# Patient Record
Sex: MALE | Race: WHITE | Employment: FULL TIME | URBAN - METROPOLITAN AREA
[De-identification: names, ages, dates, MRNs, and addresses within clinical notes are randomized per-mention and may not be internally consistent; named-entity substitution may affect disease eponyms.]

---

## 2020-11-02 ENCOUNTER — APPOINTMENT (OUTPATIENT)
Dept: GENERAL RADIOLOGY | Age: 60
DRG: 189 | End: 2020-11-02
Attending: EMERGENCY MEDICINE
Payer: COMMERCIAL

## 2020-11-02 ENCOUNTER — APPOINTMENT (OUTPATIENT)
Dept: CT IMAGING | Age: 60
DRG: 189 | End: 2020-11-02
Attending: EMERGENCY MEDICINE
Payer: COMMERCIAL

## 2020-11-02 ENCOUNTER — HOSPITAL ENCOUNTER (INPATIENT)
Age: 60
LOS: 3 days | Discharge: HOME | DRG: 189 | End: 2020-11-05
Attending: EMERGENCY MEDICINE | Admitting: INTERNAL MEDICINE
Payer: COMMERCIAL

## 2020-11-02 DIAGNOSIS — D75.1 POLYCYTHEMIA: ICD-10-CM

## 2020-11-02 DIAGNOSIS — E87.5 HYPERKALEMIA: ICD-10-CM

## 2020-11-02 DIAGNOSIS — E66.2 HYPOVENTILATION ASSOCIATED WITH OBESITY SYNDROME (HCC): ICD-10-CM

## 2020-11-02 DIAGNOSIS — J96.02 ACUTE RESPIRATORY FAILURE WITH HYPOXIA AND HYPERCAPNIA (HCC): ICD-10-CM

## 2020-11-02 DIAGNOSIS — F17.200 TOBACCO DEPENDENCE: ICD-10-CM

## 2020-11-02 DIAGNOSIS — J96.22 ACUTE ON CHRONIC RESPIRATORY FAILURE WITH HYPERCAPNIA (HCC): Primary | ICD-10-CM

## 2020-11-02 DIAGNOSIS — J96.01 ACUTE RESPIRATORY FAILURE WITH HYPOXIA AND HYPERCAPNIA (HCC): ICD-10-CM

## 2020-11-02 DIAGNOSIS — E66.01 OBESITIES, MORBID (HCC): ICD-10-CM

## 2020-11-02 DIAGNOSIS — R59.0 MEDIASTINAL ADENOPATHY: ICD-10-CM

## 2020-11-02 PROBLEM — J96.11 CHRONIC RESPIRATORY FAILURE WITH HYPOXIA (HCC): Status: ACTIVE | Noted: 2020-11-02

## 2020-11-02 LAB
ALBUMIN SERPL-MCNC: 3.5 G/DL (ref 3.2–4.6)
ALBUMIN/GLOB SERPL: 0.8 {RATIO} (ref 1.2–3.5)
ALP SERPL-CCNC: 67 U/L (ref 50–136)
ALT SERPL-CCNC: 34 U/L (ref 12–65)
ANION GAP SERPL CALC-SCNC: 2 MMOL/L (ref 7–16)
ARTERIAL PATENCY WRIST A: YES
AST SERPL-CCNC: 43 U/L (ref 15–37)
ATRIAL RATE: 70 BPM
BASE EXCESS BLD CALC-SCNC: 5 MMOL/L
BASE EXCESS BLD CALC-SCNC: 7 MMOL/L
BASE EXCESS BLD CALC-SCNC: 7 MMOL/L
BASOPHILS # BLD: 0.1 K/UL (ref 0–0.2)
BASOPHILS NFR BLD: 1 % (ref 0–2)
BDY SITE: ABNORMAL
BILIRUB SERPL-MCNC: 1.1 MG/DL (ref 0.2–1.1)
BUN SERPL-MCNC: 11 MG/DL (ref 8–23)
CALCIUM SERPL-MCNC: 9.1 MG/DL (ref 8.3–10.4)
CALCULATED P AXIS, ECG09: 26 DEGREES
CALCULATED R AXIS, ECG10: 109 DEGREES
CALCULATED T AXIS, ECG11: 41 DEGREES
CHLORIDE SERPL-SCNC: 94 MMOL/L (ref 98–107)
CO2 BLD-SCNC: 39 MMOL/L
CO2 BLD-SCNC: 40 MMOL/L
CO2 BLD-SCNC: 43 MMOL/L
CO2 SERPL-SCNC: 39 MMOL/L (ref 21–32)
COLLECT TIME,HTIME: 1140
COLLECT TIME,HTIME: 1431
COLLECT TIME,HTIME: 1803
CREAT SERPL-MCNC: 1.09 MG/DL (ref 0.8–1.5)
DIAGNOSIS, 93000: NORMAL
DIFFERENTIAL METHOD BLD: ABNORMAL
EOSINOPHIL # BLD: 0.1 K/UL (ref 0–0.8)
EOSINOPHIL NFR BLD: 2 % (ref 0.5–7.8)
ERYTHROCYTE [DISTWIDTH] IN BLOOD BY AUTOMATED COUNT: 13.3 % (ref 11.9–14.6)
EST. AVERAGE GLUCOSE BLD GHB EST-MCNC: 260 MG/DL
EXHALED MINUTE VOLUME, VE: 13 L/MIN
FLOW RATE ISTAT,IFRATE: 1 L/MIN
FLOW RATE ISTAT,IFRATE: 15 L/MIN
GAS FLOW.O2 O2 DELIVERY SYS: ABNORMAL L/MIN
GAS FLOW.O2 SETTING OXYMISER: 24 BPM
GLOBULIN SER CALC-MCNC: 4.4 G/DL (ref 2.3–3.5)
GLUCOSE BLD STRIP.AUTO-MCNC: 124 MG/DL (ref 65–100)
GLUCOSE SERPL-MCNC: 257 MG/DL (ref 65–100)
HBA1C MFR BLD: 10.7 % (ref 4.8–6)
HCO3 BLD-SCNC: 37.1 MMOL/L (ref 22–26)
HCO3 BLD-SCNC: 37.7 MMOL/L (ref 22–26)
HCO3 BLD-SCNC: 40.2 MMOL/L (ref 22–26)
HCT VFR BLD AUTO: 56.8 % (ref 41.1–50.3)
HGB BLD-MCNC: 17.5 G/DL (ref 13.6–17.2)
IMM GRANULOCYTES # BLD AUTO: 0 K/UL (ref 0–0.5)
IMM GRANULOCYTES NFR BLD AUTO: 0 % (ref 0–5)
LACTATE SERPL-SCNC: 0.9 MMOL/L (ref 0.4–2)
LIPASE SERPL-CCNC: 56 U/L (ref 73–393)
LYMPHOCYTES # BLD: 1.3 K/UL (ref 0.5–4.6)
LYMPHOCYTES NFR BLD: 15 % (ref 13–44)
MCH RBC QN AUTO: 29.7 PG (ref 26.1–32.9)
MCHC RBC AUTO-ENTMCNC: 30.8 G/DL (ref 31.4–35)
MCV RBC AUTO: 96.4 FL (ref 79.6–97.8)
MONOCYTES # BLD: 0.5 K/UL (ref 0.1–1.3)
MONOCYTES NFR BLD: 5 % (ref 4–12)
NEUTS SEG # BLD: 6.4 K/UL (ref 1.7–8.2)
NEUTS SEG NFR BLD: 77 % (ref 43–78)
NRBC # BLD: 0 K/UL (ref 0–0.2)
O2/TOTAL GAS SETTING VFR VENT: 100 %
O2/TOTAL GAS SETTING VFR VENT: 30 %
P-R INTERVAL, ECG05: 170 MS
PCO2 BLD: 75.2 MMHG (ref 35–45)
PCO2 BLD: 92.5 MMHG (ref 35–45)
PCO2 BLD: 94.8 MMHG (ref 35–45)
PEEP RESPIRATORY: 10 CMH2O
PH BLD: 7.22 [PH] (ref 7.35–7.45)
PH BLD: 7.24 [PH] (ref 7.35–7.45)
PH BLD: 7.3 [PH] (ref 7.35–7.45)
PIP ISTAT,IPIP: 23
PLATELET # BLD AUTO: 152 K/UL (ref 150–450)
PMV BLD AUTO: 12.6 FL (ref 9.4–12.3)
PO2 BLD: 306 MMHG (ref 75–100)
PO2 BLD: 73 MMHG (ref 75–100)
PO2 BLD: 92 MMHG (ref 75–100)
POTASSIUM SERPL-SCNC: 5.4 MMOL/L (ref 3.5–5.1)
PROT SERPL-MCNC: 7.9 G/DL (ref 6.3–8.2)
Q-T INTERVAL, ECG07: 378 MS
QRS DURATION, ECG06: 84 MS
QTC CALCULATION (BEZET), ECG08: 408 MS
RBC # BLD AUTO: 5.89 M/UL (ref 4.23–5.6)
SAO2 % BLD: 100 % (ref 95–98)
SAO2 % BLD: 92 % (ref 95–98)
SAO2 % BLD: 94 % (ref 95–98)
SERVICE CMNT-IMP: ABNORMAL
SODIUM SERPL-SCNC: 135 MMOL/L (ref 138–145)
SPECIMEN TYPE: ABNORMAL
SPONTANEOUS TIMED, IST: 1
TSH SERPL DL<=0.005 MIU/L-ACNC: 2.37 UIU/ML (ref 0.36–3.74)
VENTRICULAR RATE, ECG03: 70 BPM
WBC # BLD AUTO: 8.3 K/UL (ref 4.3–11.1)

## 2020-11-02 PROCEDURE — 94640 AIRWAY INHALATION TREATMENT: CPT

## 2020-11-02 PROCEDURE — 71260 CT THORAX DX C+: CPT

## 2020-11-02 PROCEDURE — 74011000250 HC RX REV CODE- 250: Performed by: INTERNAL MEDICINE

## 2020-11-02 PROCEDURE — 99285 EMERGENCY DEPT VISIT HI MDM: CPT

## 2020-11-02 PROCEDURE — 82962 GLUCOSE BLOOD TEST: CPT

## 2020-11-02 PROCEDURE — 74011000258 HC RX REV CODE- 258: Performed by: EMERGENCY MEDICINE

## 2020-11-02 PROCEDURE — 74011250636 HC RX REV CODE- 250/636: Performed by: INTERNAL MEDICINE

## 2020-11-02 PROCEDURE — 94660 CPAP INITIATION&MGMT: CPT

## 2020-11-02 PROCEDURE — 36600 WITHDRAWAL OF ARTERIAL BLOOD: CPT

## 2020-11-02 PROCEDURE — 74011250636 HC RX REV CODE- 250/636: Performed by: EMERGENCY MEDICINE

## 2020-11-02 PROCEDURE — 2709999900 HC NON-CHARGEABLE SUPPLY

## 2020-11-02 PROCEDURE — 80053 COMPREHEN METABOLIC PANEL: CPT

## 2020-11-02 PROCEDURE — 99223 1ST HOSP IP/OBS HIGH 75: CPT | Performed by: INTERNAL MEDICINE

## 2020-11-02 PROCEDURE — 83690 ASSAY OF LIPASE: CPT

## 2020-11-02 PROCEDURE — 65610000001 HC ROOM ICU GENERAL

## 2020-11-02 PROCEDURE — 74011000636 HC RX REV CODE- 636: Performed by: EMERGENCY MEDICINE

## 2020-11-02 PROCEDURE — 93005 ELECTROCARDIOGRAM TRACING: CPT | Performed by: EMERGENCY MEDICINE

## 2020-11-02 PROCEDURE — 5A09457 ASSISTANCE WITH RESPIRATORY VENTILATION, 24-96 CONSECUTIVE HOURS, CONTINUOUS POSITIVE AIRWAY PRESSURE: ICD-10-PCS | Performed by: INTERNAL MEDICINE

## 2020-11-02 PROCEDURE — 83036 HEMOGLOBIN GLYCOSYLATED A1C: CPT

## 2020-11-02 PROCEDURE — 84443 ASSAY THYROID STIM HORMONE: CPT

## 2020-11-02 PROCEDURE — 85025 COMPLETE CBC W/AUTO DIFF WBC: CPT

## 2020-11-02 PROCEDURE — 83605 ASSAY OF LACTIC ACID: CPT

## 2020-11-02 PROCEDURE — 87040 BLOOD CULTURE FOR BACTERIA: CPT

## 2020-11-02 PROCEDURE — 71045 X-RAY EXAM CHEST 1 VIEW: CPT

## 2020-11-02 PROCEDURE — 82803 BLOOD GASES ANY COMBINATION: CPT

## 2020-11-02 RX ORDER — IPRATROPIUM BROMIDE AND ALBUTEROL SULFATE 2.5; .5 MG/3ML; MG/3ML
3 SOLUTION RESPIRATORY (INHALATION)
Status: DISCONTINUED | OUTPATIENT
Start: 2020-11-02 | End: 2020-11-03

## 2020-11-02 RX ORDER — SODIUM CHLORIDE 0.9 % (FLUSH) 0.9 %
5-40 SYRINGE (ML) INJECTION EVERY 8 HOURS
Status: DISCONTINUED | OUTPATIENT
Start: 2020-11-02 | End: 2020-11-05 | Stop reason: HOSPADM

## 2020-11-02 RX ORDER — ENOXAPARIN SODIUM 100 MG/ML
40 INJECTION SUBCUTANEOUS EVERY 12 HOURS
Status: DISCONTINUED | OUTPATIENT
Start: 2020-11-02 | End: 2020-11-05 | Stop reason: HOSPADM

## 2020-11-02 RX ORDER — BUDESONIDE 0.5 MG/2ML
500 INHALANT ORAL
Status: DISCONTINUED | OUTPATIENT
Start: 2020-11-02 | End: 2020-11-05 | Stop reason: HOSPADM

## 2020-11-02 RX ORDER — SODIUM CHLORIDE 0.9 % (FLUSH) 0.9 %
10 SYRINGE (ML) INJECTION
Status: COMPLETED | OUTPATIENT
Start: 2020-11-02 | End: 2020-11-02

## 2020-11-02 RX ORDER — SODIUM CHLORIDE 0.9 % (FLUSH) 0.9 %
5-40 SYRINGE (ML) INJECTION AS NEEDED
Status: DISCONTINUED | OUTPATIENT
Start: 2020-11-02 | End: 2020-11-05 | Stop reason: HOSPADM

## 2020-11-02 RX ADMIN — IOPAMIDOL 100 ML: 755 INJECTION, SOLUTION INTRAVENOUS at 12:27

## 2020-11-02 RX ADMIN — BUDESONIDE 500 MCG: 0.5 INHALANT RESPIRATORY (INHALATION) at 19:22

## 2020-11-02 RX ADMIN — SODIUM CHLORIDE 100 ML: 900 INJECTION, SOLUTION INTRAVENOUS at 12:27

## 2020-11-02 RX ADMIN — ENOXAPARIN SODIUM 40 MG: 40 INJECTION SUBCUTANEOUS at 21:18

## 2020-11-02 RX ADMIN — Medication 10 ML: at 16:44

## 2020-11-02 RX ADMIN — Medication 10 ML: at 22:00

## 2020-11-02 RX ADMIN — Medication 10 ML: at 12:27

## 2020-11-02 RX ADMIN — IPRATROPIUM BROMIDE AND ALBUTEROL SULFATE 3 ML: .5; 3 SOLUTION RESPIRATORY (INHALATION) at 15:20

## 2020-11-02 RX ADMIN — IPRATROPIUM BROMIDE AND ALBUTEROL SULFATE 3 ML: .5; 3 SOLUTION RESPIRATORY (INHALATION) at 19:22

## 2020-11-02 RX ADMIN — SODIUM CHLORIDE 1000 ML: 900 INJECTION, SOLUTION INTRAVENOUS at 11:43

## 2020-11-02 NOTE — ED PROVIDER NOTES
61-year-old male presenting for worsening shortness of breath and dyspnea upon exertion. He tells me that his symptoms go already back to 2018. There was consideration he may have sleep apnea and was supposed to follow-up for sleep study but did not get along with the physician so he never followed up. Over the past month though he is noted that he becomes short of breath doing pretty much anything. Denies fevers, chills, chest pain. Has had no increased weight gain. He denies any new medications. The history is provided by the patient and the spouse. Shortness of Breath   This is a chronic problem. The problem occurs continuously. The current episode started more than 1 week ago. The problem has been gradually worsening. Pertinent negatives include no fever, no headaches, no coryza, no rhinorrhea, no sore throat, no swollen glands, no ear pain, no neck pain, no cough, no sputum production, no hemoptysis, no wheezing, no PND, no orthopnea, no chest pain, no syncope, no vomiting, no abdominal pain, no rash, no leg pain, no leg swelling and no claudication. It is unknown what precipitated the problem. He has tried nothing for the symptoms. The treatment provided no relief. He has had prior hospitalizations. He has had prior ED visits. He has had no prior ICU admissions. Associated medical issues do not include asthma, COPD, pneumonia, chronic lung disease, PE, CAD, heart failure, past MI, DVT or recent surgery. No past medical history on file. No past surgical history on file.       Family History:   Problem Relation Age of Onset    Hypertension Mother        Social History     Socioeconomic History    Marital status:      Spouse name: Not on file    Number of children: Not on file    Years of education: Not on file    Highest education level: Not on file   Occupational History    Not on file   Social Needs    Financial resource strain: Not on file    Food insecurity     Worry: Not on file     Inability: Not on file    Transportation needs     Medical: Not on file     Non-medical: Not on file   Tobacco Use    Smoking status: Never Smoker    Smokeless tobacco: Never Used   Substance and Sexual Activity    Alcohol use: Not Currently    Drug use: Not on file    Sexual activity: Not on file   Lifestyle    Physical activity     Days per week: Not on file     Minutes per session: Not on file    Stress: Not on file   Relationships    Social connections     Talks on phone: Not on file     Gets together: Not on file     Attends Tenriism service: Not on file     Active member of club or organization: Not on file     Attends meetings of clubs or organizations: Not on file     Relationship status: Not on file    Intimate partner violence     Fear of current or ex partner: Not on file     Emotionally abused: Not on file     Physically abused: Not on file     Forced sexual activity: Not on file   Other Topics Concern    Not on file   Social History Narrative    Not on file         ALLERGIES: Patient has no known allergies. Review of Systems   Constitutional: Negative for fever. HENT: Negative for ear pain, rhinorrhea and sore throat. Respiratory: Positive for shortness of breath. Negative for cough, hemoptysis, sputum production and wheezing. Cardiovascular: Negative for chest pain, orthopnea, claudication, leg swelling, syncope and PND. Gastrointestinal: Negative for abdominal pain and vomiting. Musculoskeletal: Negative for neck pain. Skin: Negative for rash. Neurological: Negative for headaches. All other systems reviewed and are negative. Vitals:    11/02/20 1016 11/02/20 1022   BP: (!) 163/89    Pulse: 73    Resp: 18    Temp: 98.1 °F (36.7 °C)    SpO2: (!) 76% 94%   Weight: 129.3 kg (285 lb)    Height: 5' 7\" (1.702 m)             Physical Exam  Vitals signs and nursing note reviewed. Constitutional:       Appearance: He is well-developed. He is obese.    HENT: Head: Normocephalic and atraumatic. Eyes:      Conjunctiva/sclera: Conjunctivae normal.      Pupils: Pupils are equal, round, and reactive to light. Neck:      Musculoskeletal: Normal range of motion and neck supple. Cardiovascular:      Rate and Rhythm: Normal rate and regular rhythm. Heart sounds: Normal heart sounds. Pulmonary:      Effort: Pulmonary effort is normal.      Breath sounds: Normal breath sounds. Abdominal:      General: Bowel sounds are normal.      Palpations: Abdomen is soft. Musculoskeletal: Normal range of motion. General: No deformity. Skin:     General: Skin is warm and dry. Neurological:      Mental Status: He is alert and oriented to person, place, and time. Cranial Nerves: No cranial nerve deficit. Psychiatric:         Behavior: Behavior normal.          MDM  Number of Diagnoses or Management Options  Acute on chronic respiratory failure with hypercapnia [facility]):   Diagnosis management comments: 69-year-old male presenting for worsening shortness of breath upon exertion. Tells me that he was supposed to get worked up for sleep apnea and never followed up. My concern in this circumstance is the patient has undiagnosed pulmonary fibrosis or pulmonary hypertension. We will do heart failure, pneumonia and likely PE work-up. Patient's initial O2 sats were 75% on room air and he does not have oxygen at home.        Amount and/or Complexity of Data Reviewed  Clinical lab tests: ordered and reviewed  Tests in the radiology section of CPT®: reviewed and ordered  Discuss the patient with other providers: yes (Discussed the case with the intensivist will review information)  Independent visualization of images, tracings, or specimens: yes (Reviewed the patient's imaging)    Risk of Complications, Morbidity, and/or Mortality  Presenting problems: high  Diagnostic procedures: high  Management options: high  General comments: I personally reviewed the patient's vital signs, laboratory tests, and/or radiological findings. I discussed these findings with the patient and their significance. I answered all questions and explained that given these findings there is significant concern for increased morbidity and/or mortality without immediate intervention. As a result, I recommended admission to the hospital, consulted the appropriate service, and transitioned care to that service in improved condition        ED Course as of Nov 02 1351   Mon Nov 02, 2020   1112 Reviewed the patient's chest x-ray which appears to show cardiomegaly and some pulmonary congestion    [JS]   1114 Elevated hematocrit consistent with chronic hypoxia    [JS]   1150 Patient has hypercarbic respiratory failure. Were 20 lower his nasal cannula so that we are targeting 90% oxygen saturations. Suspicion he is a chronic retainer of CO2 may have his supplemental oxygen too high which is slowing his respiratory drive    [JS]   0087 Patient does appear to have a hypercarbic respiratory failure. This is probably acute on chronic we may have made it worse with supplemental oxygen. The patient has become functionally intolerant due to hypoxia. Attempted to contact the intensivist who replied that they were unable to come right now because there was a code somewhere else in the hospital.    [JS]   [phone] callback who will evaluate the patient. Requested a repeat blood gas to see if his pH is improved at all. If not we will probably start on BiPAP and put the patient in the unit.     [JS]      ED Course User Index  [JS] Chema Reaves MD       Critical Care  Performed by: Chema Reaves MD  Authorized by: Chema Reaves MD     Critical care provider statement:     Critical care time (minutes):  39    Critical care was necessary to treat or prevent imminent or life-threatening deterioration of the following conditions:  Respiratory failure    Critical care was time spent personally by me on the following activities:  Blood draw for specimens, ordering and performing treatments and interventions, development of treatment plan with patient or surrogate, ordering and review of laboratory studies, discussions with consultants, ordering and review of radiographic studies, discussions with primary provider, pulse oximetry, evaluation of patient's response to treatment, re-evaluation of patient's condition, examination of patient, review of old charts and obtaining history from patient or surrogate    I assumed direction of critical care for this patient from another provider in my specialty: no    Comments:      Acute on chronic respiratory failure

## 2020-11-02 NOTE — H&P
PULMONARY/CCM HISTORY AND PHYSICAL:  11/2/2020    Date of Admission:  11/2/2020    The patient's chart has been reviewed and the chart has been discussed with nursing staff. Subjective: This patient has been seen and evaluated at the request of Dr. Seda Carvalho. Patient is a  male who was seen at [facility] today by Dr Dorothea Hoang, where he was noted to by hypoxic with an O2 sat in the 80s. He was instructed to come to the ER for further workup. The patient was a new patient for the practice and reported that he had not seen a PCP for many years. Last visit to to a physician office was in 2018 where he went to [facility] and was directly admitted to [facility] for further workup regarding bronchitis. Per the wife and patient, during that admission he was told that he needed O2 at home and had sleep apnea, but never was seen outpt as he did not care for the physician he seen. No further workup was ever completed. He states that he has noticed that his symptoms have persisted since that timeframe with minimal improvement noted over the years. Over the past month, he has noticed that his SOB has increased in severity. He reports that he has been using \"home oxygen\". When further questioned about this, he states that he bought the high altitude tanks that hikers use since he could not have Oxygen without a prescription. He denies any fever, productive sputum,  chills, chest pain, nausea, vomiting, diarrhea, known sick contacts, or any known kidney or lung problems. States that he was told his blood glucose was elevated upon his visit to the PCP today. Also reports, occasional wheezing with exertion, dyspnea, periodic ankle swelling at times. He denies any known asthma, COPD, pneumonia, chronic lung disease, CAD, heart failure, past MIs, DVT, recent surgeries, or kidney problems. States he may be a diabetic per the PCP this afternoon, but he is unsure.   He does not take any current medications. He reports that he is a former \"social\" smoker with the occassional cigar. He reports second hand smoke growing up. 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 States he has had about a 10 lb weight gain over the past 6 months-1 year. His wife reports that they have slept in different rooms for approx 1 year, as he \"snores and jerks\". She reports noticing apneic periods as well. A CT chest was completed today, which showed slight atelectatic areas, no pleural effusions, or concerns for PNA. It did show a slightly enlarged AP window lymph node. Initial ABG on NRB showed pH 7.24, pCO2 94.8, PO2 306, HCO3 40.2. Repeat ABG on 4 LPM NC in the ER showed PH 7.21, pCO2 95.5, PO2 92, HCO3 37.7. He was placed on BiPAP to assist with CO2 levels. He is completely alert and oriented x 4. We were consulted to assist with further pulmonary workup, including BiPap management. I plan to leave the patient on BiPap for a few hours and repeat the ABG prior to admitting to establish if he needs ICU/floor with BiPAP q HS. Given the severity of the patient's severe chronic respiratory failure secondary to hypoventilation syndrome, he needs at minimum BiPap, possible triology, nightly and during naps to help with life sustaining measures. He will require further workup in the office to establish what settings and such will be required, but for now we will place him on BiPap with RT to adjust according to ventilation requirements. Will need to be reevaluated after BiPAP therapy to establish if BiPAP is sufficient for him. If CO2 levels continue to be elevated even with BIPAP therapy, he will need to be evaluated for trilogy. Without the use of this serious harm and/or death will occur due to elevated CO2 levels. No past surgical history on file.    Social History     Tobacco Use    Smoking status: Never Smoker    Smokeless tobacco: Never Used   Substance Use Topics    Alcohol use: Not Currently      Family History   Problem Relation Age of Onset    Hypertension Mother       No Known Allergies   None       MEDS SCHEDULED:    Current Facility-Administered Medications   Medication Dose Route Frequency    albuterol-ipratropium (DUO-NEB) 2.5 MG-0.5 MG/3 ML  3 mL Nebulization QID RT    budesonide (PULMICORT) 500 mcg/2 ml nebulizer suspension  500 mcg Nebulization BID RT     No current outpatient medications on file. Review of Systems  Pertinent items are noted in HPI. Objective:     Vitals:    11/02/20 1128 11/02/20 1149 11/02/20 1344 11/02/20 1447   BP: (!) 153/90  (!) 160/77    Pulse: 65 66 63 69   Resp:  20 23 25   Temp:       SpO2: 98% 97% 95% 98%   Weight:       Height:         No intake/output data recorded. No intake/output data recorded. PHYSICAL EXAM     Physical Exam:   General:  Alert, cooperative, no acute distress, appears stated age. Eyes:  Conjunctivae/corneas clear. Nose: Nares patent and moist.    Mouth/Throat: Lips, mucosa, and tongue pink and intact. Neck: Supple, symmetrical.   Respiratory:   Diminished, no wheezing. On 4 LPM NC    Cardiovascular:  Regular rate and rhythm, S1, S2, no murmur, click, rub or gallop. Telemetry monitor: NSR   GI:   Abdomen soft, non-tender. Bowel sounds active X 4 Q. No masses,     Musculoskeletal: Extremities symmetrical, atraumatic, no cyanosis, no edema. Pulses: 2+ and symmetric all extremities. Skin: Skin color, texture, turgor normal. No rashes or lesions       Neurologic: 2+ strength bilateral upper and lower extremities, sensation throughout appropriate.   Alert and oriented x4      CT:  11/2/2020      CULTURES:  Results     Procedure Component Value Units Date/Time    CULTURE, BLOOD [[id number]] Collected:  11/02/20 1131    Order Status:  Completed Specimen:  Blood Updated:  11/02/20 1147    CULTURE, BLOOD [[id number]] Collected:  11/02/20 1037    Order Status:  Completed Specimen:  Blood Updated:  11/02/20 1129           ECHO:  none    PFTs:   PFT Results  (Last 3 results in the past 10 years)    None          LABS    Recent Labs     11/02/20  1035   WBC 8.3   HGB 17.5*   HCT 56.8*        Recent Labs     11/02/20  1035   *   K 5.4*   CL 94*   *   CO2 39*   BUN 11   CREA 1.09     No results for input(s): PH, PCO2, PO2, HCO3 in the last 72 hours. Assessment:     Hospital Problems  Date Reviewed: 11/2/2020          Codes Class Noted POA    Hypoventilation associated with obesity syndrome ([facility] [address] ICD-10-CM: Q07.0  ICD-9-CM: 278.03  11/2/2020 Unknown        Hyperkalemia ICD-10-CM: E87.5  ICD-9-CM: 276.7  11/2/2020 Yes        * (Principal) Acute respiratory failure with hypoxia and hypercapnia (HCC) ICD-10-CM: J96.01, J96.02  ICD-9-CM: 518.81  11/2/2020 Unknown        Polycythemia ICD-10-CM: D75.1  ICD-9-CM: 238.4  11/2/2020 Unknown              Plan:   --TSH  --Hgb A1C  --BiPap for now  --repeat ABG in 6 PM today  --will need follow up outpt with PFTs and sleep study. --duonebs  --will admit to ICU for further monitoring    DAVE Coy    More than 50% of time documented was spent in face-to-face contact with the patient and in the care of the patient on the floor/unit where the patient is located. Lungs: CTA b/l with distant sounds. Heart S1 and S2 audible, no murmers or rubs appreciated  Other    Using BIPAP and tolerating. Smoker for almost  40+ years. Will try nebs. Continue BIPAP  F/u ABG at 6 PM  Possible phlebotomy tomorrow  Labs and abg in AM as well. I have spoken with and examined the patient. I have reviewed the history, examination, assessment, and plan and agree with the above. Maribell Wilson MD      This note was signed electronically. Errors are unfortunately her likely due to dictation software.

## 2020-11-02 NOTE — CONSULTS
PULMONARY/CCM HISTORY AND PHYSICAL:  11/2/2020    Date of Admission:  11/2/2020    The patient's chart has been reviewed and the chart has been discussed with nursing staff. Subjective: This patient has been seen and evaluated at the request of Dr. Kevin Gillis. Patient is a  male who was seen at [facility] today by Dr Rao Hoang, where he was noted to by hypoxic with an O2 sat in the 80s. He was instructed to come to the ER for further workup. The patient was a new patient for the practice and reported that he had not seen a PCP for many years. Last visit to to a physician office was in 2018 where he went to [facility] and was directly admitted to [facility] for further workup regarding bronchitis. Per the wife and patient, during that admission he was told that he needed O2 at home and had sleep apnea, but never was seen outpt as he did not care for the physician he seen. No further workup was ever completed. He states that he has noticed that his symptoms have persisted since that timeframe with minimal improvement noted over the years. Over the past month, he has noticed that his SOB has increased in severity. He reports that he has been using \"home oxygen\". When further questioned about this, he states that he bought the high altitude tanks that hikers use since he could not have Oxygen without a prescription. He denies any fever, productive sputum,  chills, chest pain, nausea, vomiting, diarrhea, known sick contacts, or any known kidney or lung problems. States that he was told his blood glucose was elevated upon his visit to the PCP today. Also reports, occasional wheezing with exertion, dyspnea, periodic ankle swelling at times. He denies any known asthma, COPD, pneumonia, chronic lung disease, CAD, heart failure, past MIs, DVT, recent surgeries, or kidney problems. States he may be a diabetic per the PCP this afternoon, but he is unsure.   He does not take any current medications. He reports that he is a former \"social\" smoker with the occassional cigar. He reports second hand smoke growing up. 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 States he has had about a 10 lb weight gain over the past 6 months-1 year. His wife reports that they have slept in different rooms for approx 1 year, as he \"snores and jerks\". She reports noticing apneic periods as well. A CT chest was completed today, which showed slight atelectatic areas, no pleural effusions, or concerns for PNA. It did show a slightly enlarged AP window lymph node. Initial ABG on NRB showed pH 7.24, pCO2 94.8, PO2 306, HCO3 40.2. Repeat ABG on 4 LPM NC in the ER showed PH 7.21, pCO2 95.5, PO2 92, HCO3 37.7. He was placed on BiPAP to assist with CO2 levels. He is completely alert and oriented x 4. We were consulted to assist with further pulmonary workup, including BiPap management. I plan to leave the patient on BiPap for a few hours and repeat the ABG prior to admitting to establish if he needs ICU/floor with BiPAP q HS. Given the severity of the patient's severe chronic respiratory failure secondary to hypoventilation syndrome, he needs at minimum BiPap, possible triology, nightly and during naps to help with life sustaining measures. He will require further workup in the office to establish what settings and such will be required, but for now we will place him on BiPap with RT to adjust according to ventilation requirements. Will need to be reevaluated after BiPAP therapy to establish if BiPAP is sufficient for him. If CO2 levels continue to be elevated even with BIPAP therapy, he will need to be evaluated for trilogy. Without the use of this serious harm and/or death will occur due to elevated CO2 levels. No past surgical history on file.    Social History     Tobacco Use    Smoking status: Never Smoker    Smokeless tobacco: Never Used   Substance Use Topics    Alcohol use: Not Currently      Family History   Problem Relation Age of Onset    Hypertension Mother       No Known Allergies   None       MEDS SCHEDULED:    Current Facility-Administered Medications   Medication Dose Route Frequency    albuterol-ipratropium (DUO-NEB) 2.5 MG-0.5 MG/3 ML  3 mL Nebulization QID RT    budesonide (PULMICORT) 500 mcg/2 ml nebulizer suspension  500 mcg Nebulization BID RT     No current outpatient medications on file. Review of Systems  Pertinent items are noted in HPI. Objective:     Vitals:    11/02/20 1128 11/02/20 1149 11/02/20 1344 11/02/20 1447   BP: (!) 153/90  (!) 160/77    Pulse: 65 66 63 69   Resp:  20 23 25   Temp:       SpO2: 98% 97% 95% 98%   Weight:       Height:         No intake/output data recorded. No intake/output data recorded. PHYSICAL EXAM     Physical Exam:   General:  Alert, cooperative, no acute distress, appears stated age. Eyes:  Conjunctivae/corneas clear. Nose: Nares patent and moist.    Mouth/Throat: Lips, mucosa, and tongue pink and intact. Neck: Supple, symmetrical.   Respiratory:   Diminished, no wheezing. On 4 LPM NC    Cardiovascular:  Regular rate and rhythm, S1, S2, no murmur, click, rub or gallop. Telemetry monitor: NSR   GI:   Abdomen soft, non-tender. Bowel sounds active X 4 Q. No masses,     Musculoskeletal: Extremities symmetrical, atraumatic, no cyanosis, no edema. Pulses: 2+ and symmetric all extremities. Skin: Skin color, texture, turgor normal. No rashes or lesions       Neurologic: 2+ strength bilateral upper and lower extremities, sensation throughout appropriate.   Alert and oriented x4      CT:  11/2/2020      CULTURES:  Results     Procedure Component Value Units Date/Time    CULTURE, BLOOD [[id number]] Collected:  11/02/20 1131    Order Status:  Completed Specimen:  Blood Updated:  11/02/20 1147    CULTURE, BLOOD [[id number]] Collected:  11/02/20 1037    Order Status:  Completed Specimen:  Blood Updated:  11/02/20 1129           ECHO:  none    PFTs:   PFT Results  (Last 3 results in the past 10 years)    None          LABS    Recent Labs     11/02/20  1035   WBC 8.3   HGB 17.5*   HCT 56.8*        Recent Labs     11/02/20  1035   *   K 5.4*   CL 94*   *   CO2 39*   BUN 11   CREA 1.09     No results for input(s): PH, PCO2, PO2, HCO3 in the last 72 hours. Assessment:     Hospital Problems  Date Reviewed: 11/2/2020          Codes Class Noted POA    Hypoventilation associated with obesity syndrome ([facility] [address] ICD-10-CM: I92.3  ICD-9-CM: 278.03  11/2/2020 Unknown        Hyperkalemia ICD-10-CM: E87.5  ICD-9-CM: 276.7  11/2/2020 Yes        * (Principal) Acute respiratory failure with hypoxia and hypercapnia (HCC) ICD-10-CM: J96.01, J96.02  ICD-9-CM: 518.81  11/2/2020 Unknown        Polycythemia ICD-10-CM: D75.1  ICD-9-CM: 238.4  11/2/2020 Unknown              Plan:   --TSH  --Hgb A1C  --BiPap for now  --repeat ABG in 6 PM today  --will need follow up outpt with PFTs and sleep study. --duonebs  --will admit to ICU for further monitoring    Tex Ontiveros NP-C    More than 50% of time documented was spent in face-to-face contact with the patient and in the care of the patient on the floor/unit where the patient is located.      See H&P    Roberto Carlos Neely MD

## 2020-11-02 NOTE — ROUTINE PROCESS
TRANSFER - OUT REPORT: 
 
Verbal report given to Merari Pacheco RN on [facility]  being transferred to ICU for routine progression of care Report consisted of patients Situation, Background, Assessment and  
Recommendations(SBAR). Information from the following report(s) ED Summary was reviewed with the receiving nurse. Lines:  
Peripheral IV 11/02/20 Left Antecubital (Active) Site Assessment Clean, dry, & intact 11/02/20 1140 Phlebitis Assessment 0 11/02/20 1140 Infiltration Assessment 0 11/02/20 1140 Dressing Status Clean, dry, & intact 11/02/20 1140 Dressing Type 4 X 4 11/02/20 1140 Hub Color/Line Status Pink 11/02/20 1140 Peripheral IV 11/02/20 Right Forearm (Active) Site Assessment Clean, dry, & intact 11/02/20 1153 Phlebitis Assessment 0 11/02/20 1153 Infiltration Assessment 0 11/02/20 1153 Dressing Status Clean, dry, & intact 11/02/20 1153 Dressing Type 4 X 4 11/02/20 1153 Hub Color/Line Status Pink 11/02/20 1153 Opportunity for questions and clarification was provided. Patient transported with: 
 Monitor Registered Nurse Resp with bipap

## 2020-11-02 NOTE — ED TRIAGE NOTES
Ambulatory to triage wearing mask. States he was sent to ER by pcp for hypoxia. For the last week pt has had increasing shob. Denies cough. No fevers. Does not see pulmonologist.  Pt's oxygen level 76% on room air in triage. Denies cardiac issues.

## 2020-11-02 NOTE — PROGRESS NOTES
Pt arrived via stretcher from ED. Dual skin assessment performed by Sima Keith RN and Brandon Andrew RN. Blanchable red discoloration noted on bilateral shins, no pressure areas identified at the time of this assessment.

## 2020-11-03 PROBLEM — R59.0 MEDIASTINAL ADENOPATHY: Status: ACTIVE | Noted: 2020-11-03

## 2020-11-03 PROBLEM — F17.200 TOBACCO DEPENDENCE: Status: ACTIVE | Noted: 2020-11-03

## 2020-11-03 PROBLEM — E66.01 OBESITIES, MORBID (HCC): Status: ACTIVE | Noted: 2020-11-03

## 2020-11-03 LAB
ANION GAP SERPL CALC-SCNC: 3 MMOL/L (ref 7–16)
ARTERIAL PATENCY WRIST A: YES
BASE EXCESS BLD CALC-SCNC: 7 MMOL/L
BASOPHILS # BLD: 0.1 K/UL (ref 0–0.2)
BASOPHILS NFR BLD: 1 % (ref 0–2)
BDY SITE: ABNORMAL
BUN SERPL-MCNC: 11 MG/DL (ref 8–23)
CALCIUM SERPL-MCNC: 8.5 MG/DL (ref 8.3–10.4)
CHLORIDE SERPL-SCNC: 100 MMOL/L (ref 98–107)
CO2 BLD-SCNC: 39 MMOL/L
CO2 SERPL-SCNC: 36 MMOL/L (ref 21–32)
COLLECT TIME,HTIME: 310
CREAT SERPL-MCNC: 0.93 MG/DL (ref 0.8–1.5)
DIFFERENTIAL METHOD BLD: ABNORMAL
EOSINOPHIL # BLD: 0.1 K/UL (ref 0–0.8)
EOSINOPHIL NFR BLD: 2 % (ref 0.5–7.8)
ERYTHROCYTE [DISTWIDTH] IN BLOOD BY AUTOMATED COUNT: 13.3 % (ref 11.9–14.6)
EXHALED MINUTE VOLUME, VE: 12.9 L/MIN
GAS FLOW.O2 O2 DELIVERY SYS: ABNORMAL L/MIN
GAS FLOW.O2 SETTING OXYMISER: 24 BPM
GLUCOSE BLD STRIP.AUTO-MCNC: 185 MG/DL (ref 65–100)
GLUCOSE BLD STRIP.AUTO-MCNC: 297 MG/DL (ref 65–100)
GLUCOSE BLD STRIP.AUTO-MCNC: 324 MG/DL (ref 65–100)
GLUCOSE SERPL-MCNC: 134 MG/DL (ref 65–100)
HCO3 BLD-SCNC: 36.6 MMOL/L (ref 22–26)
HCT VFR BLD AUTO: 54.3 % (ref 41.1–50.3)
HGB BLD-MCNC: 16.3 G/DL (ref 13.6–17.2)
IMM GRANULOCYTES # BLD AUTO: 0 K/UL (ref 0–0.5)
IMM GRANULOCYTES NFR BLD AUTO: 0 % (ref 0–5)
INSPIRATION.DURATION SETTING TIME VENT: 0.9 SEC
LYMPHOCYTES # BLD: 1.3 K/UL (ref 0.5–4.6)
LYMPHOCYTES NFR BLD: 16 % (ref 13–44)
MAGNESIUM SERPL-MCNC: 2.3 MG/DL (ref 1.8–2.4)
MCH RBC QN AUTO: 29.5 PG (ref 26.1–32.9)
MCHC RBC AUTO-ENTMCNC: 30 G/DL (ref 31.4–35)
MCV RBC AUTO: 98.4 FL (ref 79.6–97.8)
MONOCYTES # BLD: 0.6 K/UL (ref 0.1–1.3)
MONOCYTES NFR BLD: 7 % (ref 4–12)
NEUTS SEG # BLD: 5.7 K/UL (ref 1.7–8.2)
NEUTS SEG NFR BLD: 74 % (ref 43–78)
NRBC # BLD: 0 K/UL (ref 0–0.2)
O2/TOTAL GAS SETTING VFR VENT: 40 %
PCO2 BLD: 70.5 MMHG (ref 35–45)
PH BLD: 7.32 [PH] (ref 7.35–7.45)
PHOSPHATE SERPL-MCNC: 3.8 MG/DL (ref 2.3–3.7)
PIP ISTAT,IPIP: 19
PLATELET # BLD AUTO: 123 K/UL (ref 150–450)
PMV BLD AUTO: 11.4 FL (ref 9.4–12.3)
PO2 BLD: 89 MMHG (ref 75–100)
POTASSIUM SERPL-SCNC: 4.5 MMOL/L (ref 3.5–5.1)
RBC # BLD AUTO: 5.52 M/UL (ref 4.23–5.6)
SAO2 % BLD: 96 % (ref 95–98)
SERVICE CMNT-IMP: ABNORMAL
SERVICE CMNT-IMP: ABNORMAL
SODIUM SERPL-SCNC: 139 MMOL/L (ref 138–145)
SPECIMEN TYPE: ABNORMAL
VT SETTING VENT: 415 ML
WBC # BLD AUTO: 7.7 K/UL (ref 4.3–11.1)

## 2020-11-03 PROCEDURE — 36600 WITHDRAWAL OF ARTERIAL BLOOD: CPT

## 2020-11-03 PROCEDURE — 84100 ASSAY OF PHOSPHORUS: CPT

## 2020-11-03 PROCEDURE — 99233 SBSQ HOSP IP/OBS HIGH 50: CPT | Performed by: INTERNAL MEDICINE

## 2020-11-03 PROCEDURE — 74011250636 HC RX REV CODE- 250/636: Performed by: INTERNAL MEDICINE

## 2020-11-03 PROCEDURE — 82803 BLOOD GASES ANY COMBINATION: CPT

## 2020-11-03 PROCEDURE — 94640 AIRWAY INHALATION TREATMENT: CPT

## 2020-11-03 PROCEDURE — 80048 BASIC METABOLIC PNL TOTAL CA: CPT

## 2020-11-03 PROCEDURE — 82962 GLUCOSE BLOOD TEST: CPT

## 2020-11-03 PROCEDURE — 94660 CPAP INITIATION&MGMT: CPT

## 2020-11-03 PROCEDURE — 74011000250 HC RX REV CODE- 250: Performed by: INTERNAL MEDICINE

## 2020-11-03 PROCEDURE — 83735 ASSAY OF MAGNESIUM: CPT

## 2020-11-03 PROCEDURE — 94760 N-INVAS EAR/PLS OXIMETRY 1: CPT

## 2020-11-03 PROCEDURE — 36415 COLL VENOUS BLD VENIPUNCTURE: CPT

## 2020-11-03 PROCEDURE — 77010033678 HC OXYGEN DAILY

## 2020-11-03 PROCEDURE — 65270000029 HC RM PRIVATE

## 2020-11-03 PROCEDURE — 85025 COMPLETE CBC W/AUTO DIFF WBC: CPT

## 2020-11-03 PROCEDURE — 74011636637 HC RX REV CODE- 636/637: Performed by: INTERNAL MEDICINE

## 2020-11-03 PROCEDURE — C8929 TTE W OR WO FOL WCON,DOPPLER: HCPCS

## 2020-11-03 RX ORDER — IPRATROPIUM BROMIDE AND ALBUTEROL SULFATE 2.5; .5 MG/3ML; MG/3ML
3 SOLUTION RESPIRATORY (INHALATION)
Status: DISCONTINUED | OUTPATIENT
Start: 2020-11-03 | End: 2020-11-05 | Stop reason: HOSPADM

## 2020-11-03 RX ADMIN — ENOXAPARIN SODIUM 40 MG: 40 INJECTION SUBCUTANEOUS at 09:00

## 2020-11-03 RX ADMIN — Medication 10 ML: at 21:02

## 2020-11-03 RX ADMIN — IPRATROPIUM BROMIDE AND ALBUTEROL SULFATE 3 ML: .5; 3 SOLUTION RESPIRATORY (INHALATION) at 08:14

## 2020-11-03 RX ADMIN — HUMAN INSULIN 8 UNITS: 100 INJECTION, SOLUTION SUBCUTANEOUS at 21:02

## 2020-11-03 RX ADMIN — IPRATROPIUM BROMIDE AND ALBUTEROL SULFATE 3 ML: .5; 3 SOLUTION RESPIRATORY (INHALATION) at 19:38

## 2020-11-03 RX ADMIN — BUDESONIDE 500 MCG: 0.5 INHALANT RESPIRATORY (INHALATION) at 19:38

## 2020-11-03 RX ADMIN — METHYLPREDNISOLONE SODIUM SUCCINATE 40 MG: 125 INJECTION, POWDER, FOR SOLUTION INTRAMUSCULAR; INTRAVENOUS at 20:31

## 2020-11-03 RX ADMIN — BUDESONIDE 500 MCG: 0.5 INHALANT RESPIRATORY (INHALATION) at 08:14

## 2020-11-03 RX ADMIN — ENOXAPARIN SODIUM 40 MG: 40 INJECTION SUBCUTANEOUS at 21:00

## 2020-11-03 RX ADMIN — HUMAN INSULIN 6 UNITS: 100 INJECTION, SOLUTION SUBCUTANEOUS at 17:12

## 2020-11-03 RX ADMIN — PERFLUTREN 1 ML: 6.52 INJECTION, SUSPENSION INTRAVENOUS at 09:00

## 2020-11-03 RX ADMIN — METHYLPREDNISOLONE SODIUM SUCCINATE 40 MG: 125 INJECTION, POWDER, FOR SOLUTION INTRAMUSCULAR; INTRAVENOUS at 08:57

## 2020-11-03 RX ADMIN — Medication 10 ML: at 13:10

## 2020-11-03 RX ADMIN — HUMAN INSULIN 2 UNITS: 100 INJECTION, SOLUTION SUBCUTANEOUS at 11:30

## 2020-11-03 RX ADMIN — Medication 10 ML: at 06:00

## 2020-11-03 NOTE — PROGRESS NOTES
PULMONARY/[facility] ICU Daily Progress:  11/3/2020     This patient has been seen and evaluated at the request of Dr. Gilma Wallace. Patient is a  male who was seen at [facility] today by Dr Adama Smith, where he was noted to by hypoxic with an O2 sat in the 80s. He was instructed to come to the ER for further workup. The patient was a new patient for the practice and reported that he had not seen a PCP for many years. Last visit to to a physician office was in 2018 where he went to [facility] and was directly admitted to [facility] for further workup regarding bronchitis. Per the wife and patient, during that admission he was told that he needed O2 at home and had sleep apnea, but never was seen outpt as he did not care for the physician he seen. No further workup was ever completed. He states that he has noticed that his symptoms have persisted since that timeframe with minimal improvement noted over the years. Over the past month, he has noticed that his SOB has increased in severity. He reports that he has been using \"home oxygen\". When further questioned about this, he states that he bought the high altitude tanks that hikers use since he could not have Oxygen without a prescription. He denies any fever, productive sputum,  chills, chest pain, nausea, vomiting, diarrhea, known sick contacts, or any known kidney or lung problems. States that he was told his blood glucose was elevated upon his visit to the PCP today. Also reports, occasional wheezing with exertion, dyspnea, periodic ankle swelling at times. He denies any known asthma, COPD, pneumonia, chronic lung disease, CAD, heart failure, past MIs, DVT, recent surgeries, or kidney problems. States he may be a diabetic per the PCP this afternoon, but he is unsure. He does not take any current medications. He reports that he is a former \"social\" smoker with the occassional cigar. He reports second hand smoke growing up.   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 States he has had about a 10 lb weight gain over the past 6 months-1 year. His wife reports that they have slept in different rooms for approx 1 year, as he \"snores and jerks\". She reports noticing apneic periods as well. A CT chest was completed today, which showed slight atelectatic areas, no pleural effusions, or concerns for PNA. It did show a slightly enlarged AP window lymph node. Initial ABG on NRB showed pH 7.24, pCO2 94.8, PO2 306, HCO3 40.2. Repeat ABG on 4 LPM NC in the ER showed PH 7.21, pCO2 95.5, PO2 92, HCO3 37.7. He was placed on BiPAP to assist with CO2 levels. He is completely alert and oriented x 4. We were consulted to assist with further pulmonary workup, including BiPap management. I plan to leave the patient on BiPap for a few hours and repeat the ABG prior to admitting to establish if he needs ICU/floor with BiPAP q HS. Given the severity of the patient's severe chronic respiratory failure secondary to hypoventilation syndrome, he needs at minimum BiPap, possible triology, nightly and during naps to help with life sustaining measures. He will require further workup in the office to establish what settings and such will be required, but for now we will place him on BiPap with RT to adjust according to ventilation requirements. Will need to be reevaluated after BiPAP therapy to establish if BiPAP is sufficient for him. If CO2 levels continue to be elevated even with BIPAP therapy, he will need to be evaluated for trilogy. Without the use of this serious harm and/or death will occur due to elevated CO2 levels. Date of Admission:  11/2/2020    The patient's chart has been reviewed and the chart has been discussed with nursing staff. Subjective:     Patient is awake and alert and not in distress. States did much better with the BIPAP last night.  States feels the best he has done on years. Not cough. No wheezing      Review of Systems:  -Fever  -Headaches  -Chest pain  +Dyspnea (less), -wheezing, -cough  -Abdominal pain, -constipation  -Leg swelling  All other organ systems grossly normal.        No past surgical history on file. Social History     Tobacco Use    Smoking status: Current Some Day Smoker     Types: Cigars    Smokeless tobacco: Never Used   Substance Use Topics    Alcohol use: Not Currently      Family History   Problem Relation Age of Onset    Hypertension Mother       No Known Allergies   None       MEDS SCHEDULED:    Current Facility-Administered Medications   Medication Dose Route Frequency    albuterol-ipratropium (DUO-NEB) 2.5 MG-0.5 MG/3 ML  3 mL Nebulization QID RT    budesonide (PULMICORT) 500 mcg/2 ml nebulizer suspension  500 mcg Nebulization BID RT    sodium chloride (NS) flush 5-40 mL  5-40 mL IntraVENous Q8H    sodium chloride (NS) flush 5-40 mL  5-40 mL IntraVENous PRN    enoxaparin (LOVENOX) injection 40 mg  40 mg SubCUTAneous Q12H    insulin regular (NOVOLIN R, HUMULIN R) injection   SubCUTAneous AC&HS         Objective:     Vitals:    11/03/20 0614 11/03/20 0629 11/03/20 0644 11/03/20 0814   BP: 121/66 117/64 124/67    Pulse: (!) 59 60 64    Resp: 22 22 19    Temp:       SpO2: 94% 95% 93% (!) 89%   Weight:       Height:         No intake/output data recorded. 11/01 1901 - 11/03 0700  In: 0   Out: 150 [Urine:150]      PHYSICAL EXAM     Constitutional:  the patient is well developed and in no acute distress on 3 LPM  EENMT:  Sclera clear, pupils equal, oral mucosa moist, narrow airway nares patient  Respiratory: Clear to auscultation bilaterally  Cardiovascular:  RRR without M,G,R  Gastrointestinal: soft and non-tender; with positive bowel sounds. Musculoskeletal: warm without cyanosis. There is no lower leg edema.      Skin:  no jaundice or rashes, no wounds   Neurologic: no gross neuro deficits     Psychiatric:  alert and oriented x 3    CT:  11/2/2020      CULTURES:  Results     Procedure Component Value Units Date/Time    CULTURE, BLOOD [[id number]] Collected:  11/02/20 1131    Order Status:  Completed Specimen:  Blood Updated:  11/02/20 1147    CULTURE, BLOOD [[id number]] Collected:  11/02/20 1037    Order Status:  Completed Specimen:  Blood Updated:  11/02/20 1129           ECHO:  none    PFTs:   PFT Results  (Last 3 results in the past 10 years)    None          LABS    Recent Labs     11/03/20  0314 11/02/20  1035   WBC 7.7 8.3   HGB 16.3 17.5*   HCT 54.3* 56.8*   * 152     Recent Labs     11/03/20  0314 11/02/20  1035    135*   K 4.5 5.4*    94*   * 257*   CO2 36* 39*   BUN 11 11   CREA 0.93 1.09   MG 2.3  --    PHOS 3.8*  --        Recent Labs     11/03/20  0311 11/02/20  1803 11/02/20  1433   PHI 7.32* 7.30* 7.22*   PCO2I 70.5* 75.2* 92.5*   PO2I 89 73* 92   HCO3I 36.6* 37.1* 37.7*     Recent Labs     11/02/20  1035   LAC 0.9       No results for input(s): PH, PCO2, PO2, HCO3 in the last 72 hours. Assessment:     Hospital Problems  Date Reviewed: 11/2/2020          Codes Class Noted POA    Mediastinal adenopathy ICD-10-CM: R59.0  ICD-9-CM: 785.6  11/3/2020 Unknown        Obesities, morbid ([facility] ICD-10-CM: E66.01  ICD-9-CM: 278.01  11/3/2020 Unknown        Hypoventilation associated with obesity syndrome ([facility] ICD-10-CM: R46.7  ICD-9-CM: 278.03  11/2/2020 Unknown        Hyperkalemia ICD-10-CM: E87.5  ICD-9-CM: 276.7  11/2/2020 Yes        * (Principal) Acute respiratory failure with hypoxia and hypercapnia ([facility] ICD-10-CM: J96.01, J96.02  ICD-9-CM: 518.81  11/2/2020 Unknown        Polycythemia ICD-10-CM: D75.1  ICD-9-CM: 238.4  11/2/2020 Unknown            Patient with smoker history/obesity came in with hypercapnic respiratory failure. Also with polycythemia.    Plan:   --doing well with BIPAP ST and will change to just BIPAP tonight and spoke with  to evaluate for home possible BIPAP vs BIPAP ST. ABG in AM is improving and PCO2 in 70's from 90's  --quit smoking  --check oxygen needs prior to discharge. On 3 lPM now and did not use at home. --continue nebs   --will give few doses of steroids  --sugars noted with hgA1c of 10 and will place on diabetic diet and will need workup for it  --social work consult  --will need outpatient spirometry to check for COPD which is likely  --will needs outpatient f/u for lymphadenopathy. Aware can have risk for CA given long term smoking history. --diabetic diet  --DVT prophylaxis         Can goto the floor -- Spoke with Dr. Guy Litten to take over care and we will still f/u his care    Nabil Loredo MD    More than 50% of time documented was spent in face-to-face contact with the patient and in the care of the patient on the floor/unit where the patient is located.

## 2020-11-03 NOTE — PROGRESS NOTES
Care Management Interventions  PCP Verified by CM: Yes(Dr. Bruce Thrasher)  Mode of Transport at Discharge: Self  Transition of Care Consult (CM Consult): Discharge Planning  Discharge Durable Medical Equipment: Yes(Bipap or Trilogy possible Home O2 needs)  Physical Therapy Consult: No  Occupational Therapy Consult: No  Current Support Network: Own Home, Lives with Spouse  Confirm Follow Up Transport: Self   Resource Information Provided?: No  Discharge Location  Discharge Placement: Home    CM met patient in room. Patient alert and orient and veriifed all demographic information to be correct. Patient stated he and his wife live in a one story home that has no steps to enter the residence. Patient stated he does not use any DME at this time and is completely independent with all ADL's and continues to drive. Patient stated he does not take medications regularly but obtains medications from [facility] when needed. Patient stated he has never used HH or STR and would like to return home at discharge. Patient will need a BiPap or Trilogy at discharge and may have oxygen needs. PT/OT have not been consulted at this time. Patient will need oxygen qualifier prior to discharge. CM will continue to follow patient during hospitalization to assist with discharge need. Please consult or notify CM of new needs.

## 2020-11-03 NOTE — PROGRESS NOTES
TRANSFER - IN REPORT:    Verbal report received from Paul Reyes(name) on Rose Marie Abreu  being received from 3112(unit) for routine progression of care      Report consisted of patients Situation, Background, Assessment and   Recommendations(SBAR). Information from the following report(s) SBAR, Kardex, Intake/Output, MAR and Recent Results was reviewed with the receiving nurse. Opportunity for questions and clarification was provided. Assessment completed upon patients arrival to unit and care assumed.

## 2020-11-03 NOTE — PROGRESS NOTES
TRANSFER - OUT REPORT:    Verbal report given to YONG Quigley(name) on [facility]  being transferred to [facility](unit) for routine progression of care       Report consisted of patients Situation, Background, Assessment and   Recommendations(SBAR). Information from the following report(s) SBAR, Kardex, Intake/Output, MAR and Recent Results was reviewed with the receiving nurse. Lines:   Peripheral IV 11/02/20 Left Antecubital (Active)   Site Assessment Clean, dry, & intact 11/03/20 0259   Phlebitis Assessment 0 11/03/20 0259   Infiltration Assessment 0 11/03/20 0259   Dressing Status Clean, dry, & intact 11/03/20 0259   Dressing Type Transparent 11/03/20 0259   Hub Color/Line Status Pink;Patent; Flushed 11/03/20 0259   Alcohol Cap Used No 11/03/20 0259       Peripheral IV 11/02/20 Right Forearm (Active)   Site Assessment Clean, dry, & intact 11/03/20 0259   Phlebitis Assessment 0 11/03/20 0259   Infiltration Assessment 0 11/03/20 0259   Dressing Status Clean, dry, & intact 11/03/20 0259   Dressing Type Transparent 11/03/20 0259   Hub Color/Line Status Pink;Patent; Flushed 11/03/20 0259   Alcohol Cap Used No 11/03/20 0259        Opportunity for questions and clarification was provided.

## 2020-11-03 NOTE — PROGRESS NOTES
Patient is alert, awake. Taken off Bipap for breakfast. SpO2 ~ 85-87% on RA. States he \"feels better than he has in weeks\" after this night on Bipap.

## 2020-11-03 NOTE — PROGRESS NOTES
Hourly rounds completed, pt denies needs at this time. Pt doing well. Gave some insulin education to pt and wife. Consults placed for dietitian  and DM mgt. Pt administered evening insulin shot with no problems.

## 2020-11-03 NOTE — PROGRESS NOTES
Bedside shift change report given to [facility] (oncoming nurse) by Deane Duverney (offgoing nurse). Report included the following information SBAR, Kardex, ED Summary, Intake/Output, MAR, Recent Results, Cardiac Rhythm NSR and Alarm Parameters .

## 2020-11-03 NOTE — PROGRESS NOTES
11/03/20 1031   Dual Skin Pressure Injury Assessment   Dual Skin Pressure Injury Assessment WDL   Second Care Provider (Based on [address] [place] [address]) linda,Rn   Skin Integumentary   Skin Integumentary (WDL) WDL    Pressure  Injury Documentation No Pressure Injury Noted-Pressure Ulcer Prevention Initiated   Wound Prevention and Protection Methods   Orientation of Wound Prevention Posterior   Location of Wound Prevention Sacrum/Coccyx   Dressing Present  No   Wound Offloading (Prevention Methods) Bed, pressure redistribution/air;Bed, pressure reduction mattress;Pillows;Repositioning;Turning

## 2020-11-04 PROBLEM — E66.01 OBESITIES, MORBID (HCC): Chronic | Status: ACTIVE | Noted: 2020-11-03

## 2020-11-04 PROBLEM — F17.200 TOBACCO DEPENDENCE: Chronic | Status: ACTIVE | Noted: 2020-11-03

## 2020-11-04 PROBLEM — D75.1 POLYCYTHEMIA: Chronic | Status: ACTIVE | Noted: 2020-11-02

## 2020-11-04 PROBLEM — E87.5 HYPERKALEMIA: Status: RESOLVED | Noted: 2020-11-02 | Resolved: 2020-11-04

## 2020-11-04 PROBLEM — E66.2 HYPOVENTILATION ASSOCIATED WITH OBESITY SYNDROME (HCC): Chronic | Status: ACTIVE | Noted: 2020-11-02

## 2020-11-04 PROBLEM — R59.0 MEDIASTINAL ADENOPATHY: Chronic | Status: ACTIVE | Noted: 2020-11-03

## 2020-11-04 LAB
ARTERIAL PATENCY WRIST A: YES
BASE EXCESS BLD CALC-SCNC: 4 MMOL/L
BDY SITE: ABNORMAL
CO2 BLD-SCNC: 33 MMOL/L
COLLECT TIME,HTIME: 1210
FLOW RATE ISTAT,IFRATE: 2 L/MIN
GAS FLOW.O2 O2 DELIVERY SYS: ABNORMAL L/MIN
GLUCOSE BLD STRIP.AUTO-MCNC: 224 MG/DL (ref 65–100)
GLUCOSE BLD STRIP.AUTO-MCNC: 246 MG/DL (ref 65–100)
GLUCOSE BLD STRIP.AUTO-MCNC: 262 MG/DL (ref 65–100)
GLUCOSE BLD STRIP.AUTO-MCNC: 315 MG/DL (ref 65–100)
HCO3 BLD-SCNC: 31.7 MMOL/L (ref 22–26)
O2/TOTAL GAS SETTING VFR VENT: 28 %
PCO2 BLD: 55.1 MMHG (ref 35–45)
PH BLD: 7.37 [PH] (ref 7.35–7.45)
PO2 BLD: 57 MMHG (ref 75–100)
SAO2 % BLD: 88 % (ref 95–98)
SERVICE CMNT-IMP: ABNORMAL
SPECIMEN TYPE: ABNORMAL

## 2020-11-04 PROCEDURE — 94760 N-INVAS EAR/PLS OXIMETRY 1: CPT

## 2020-11-04 PROCEDURE — 74011636637 HC RX REV CODE- 636/637: Performed by: FAMILY MEDICINE

## 2020-11-04 PROCEDURE — 74011636637 HC RX REV CODE- 636/637: Performed by: INTERNAL MEDICINE

## 2020-11-04 PROCEDURE — 77010033678 HC OXYGEN DAILY

## 2020-11-04 PROCEDURE — 74011250636 HC RX REV CODE- 250/636: Performed by: INTERNAL MEDICINE

## 2020-11-04 PROCEDURE — 36600 WITHDRAWAL OF ARTERIAL BLOOD: CPT

## 2020-11-04 PROCEDURE — 82803 BLOOD GASES ANY COMBINATION: CPT

## 2020-11-04 PROCEDURE — 94640 AIRWAY INHALATION TREATMENT: CPT

## 2020-11-04 PROCEDURE — 74011000250 HC RX REV CODE- 250: Performed by: INTERNAL MEDICINE

## 2020-11-04 PROCEDURE — 82962 GLUCOSE BLOOD TEST: CPT

## 2020-11-04 PROCEDURE — 74011250637 HC RX REV CODE- 250/637: Performed by: FAMILY MEDICINE

## 2020-11-04 PROCEDURE — 99232 SBSQ HOSP IP/OBS MODERATE 35: CPT | Performed by: INTERNAL MEDICINE

## 2020-11-04 PROCEDURE — 65270000029 HC RM PRIVATE

## 2020-11-04 PROCEDURE — 94761 N-INVAS EAR/PLS OXIMETRY MLT: CPT

## 2020-11-04 PROCEDURE — 2709999900 HC NON-CHARGEABLE SUPPLY

## 2020-11-04 RX ORDER — METFORMIN HYDROCHLORIDE 500 MG/1
500 TABLET ORAL 2 TIMES DAILY WITH MEALS
Status: DISCONTINUED | OUTPATIENT
Start: 2020-11-04 | End: 2020-11-05 | Stop reason: HOSPADM

## 2020-11-04 RX ORDER — INSULIN GLARGINE 100 [IU]/ML
20 INJECTION, SOLUTION SUBCUTANEOUS DAILY
Status: DISCONTINUED | OUTPATIENT
Start: 2020-11-04 | End: 2020-11-05 | Stop reason: HOSPADM

## 2020-11-04 RX ORDER — PREDNISONE 20 MG/1
40 TABLET ORAL
Status: DISCONTINUED | OUTPATIENT
Start: 2020-11-05 | End: 2020-11-04

## 2020-11-04 RX ADMIN — Medication 10 ML: at 16:43

## 2020-11-04 RX ADMIN — Medication 10 ML: at 05:09

## 2020-11-04 RX ADMIN — METHYLPREDNISOLONE SODIUM SUCCINATE 40 MG: 125 INJECTION, POWDER, FOR SOLUTION INTRAMUSCULAR; INTRAVENOUS at 08:12

## 2020-11-04 RX ADMIN — BUDESONIDE 500 MCG: 0.5 INHALANT RESPIRATORY (INHALATION) at 19:43

## 2020-11-04 RX ADMIN — METFORMIN HYDROCHLORIDE 500 MG: 500 TABLET ORAL at 16:43

## 2020-11-04 RX ADMIN — BUDESONIDE 500 MCG: 0.5 INHALANT RESPIRATORY (INHALATION) at 07:40

## 2020-11-04 RX ADMIN — ENOXAPARIN SODIUM 40 MG: 40 INJECTION SUBCUTANEOUS at 08:13

## 2020-11-04 RX ADMIN — HUMAN INSULIN 4 UNITS: 100 INJECTION, SOLUTION SUBCUTANEOUS at 21:50

## 2020-11-04 RX ADMIN — HUMAN INSULIN 4 UNITS: 100 INJECTION, SOLUTION SUBCUTANEOUS at 08:13

## 2020-11-04 RX ADMIN — IPRATROPIUM BROMIDE AND ALBUTEROL SULFATE 3 ML: .5; 3 SOLUTION RESPIRATORY (INHALATION) at 07:40

## 2020-11-04 RX ADMIN — ENOXAPARIN SODIUM 40 MG: 40 INJECTION SUBCUTANEOUS at 21:50

## 2020-11-04 RX ADMIN — IPRATROPIUM BROMIDE AND ALBUTEROL SULFATE 3 ML: .5; 3 SOLUTION RESPIRATORY (INHALATION) at 19:43

## 2020-11-04 RX ADMIN — INSULIN GLARGINE 20 UNITS: 100 INJECTION, SOLUTION SUBCUTANEOUS at 11:22

## 2020-11-04 RX ADMIN — Medication 5 ML: at 21:50

## 2020-11-04 RX ADMIN — HUMAN INSULIN 6 UNITS: 100 INJECTION, SOLUTION SUBCUTANEOUS at 16:42

## 2020-11-04 RX ADMIN — HUMAN INSULIN 8 UNITS: 100 INJECTION, SOLUTION SUBCUTANEOUS at 11:22

## 2020-11-04 NOTE — PROGRESS NOTES
Oxygen Qualifier       Room air: SpO2 with O2 and liter flow   Resting SpO2  85%  86% on 1L   89% on 2L   92% on 3L   Ambulating SpO2  81%  82% on 1L   84% on 2L   87% on 3L   91% on 4L       Completed by:    Casandra Gutierrez, RT

## 2020-11-04 NOTE — DIABETES MGMT
Patient admitted 20 with acute respiratory failure. Wife at bedside. History of morbid obesity. New diagnosis type 2 DM. HbA1c 10.7 (eA). Blood glucose on admission 257. . Blood glucose range yesterday 134-324 with pt receiving steroids, Solu-medrol 40 mg IV every 12 hours. Pt states no family history of DM. Pt states that he does not like to go to the MD and had not been to an MD for 2 years. Recently had an appointment to establish a PCP with Dr. Odessa Rivera at [facility]. Patient given educational material, \"Diabetes Self-Management: A Patient Teaching Guide\", which was reviewed with patient and wife. Explained basic physiology of diabetes, as well as causes, signs and symptoms, and treatments for hypoglycemia and hyperglycemia. Described the effects of poor glycemic control and the development of long-term complications such as renal, eye, nerve, and cardiovascular disease. Described proper diabetic foot care and the importance of checking feet daily. Patient denies numbness and tingling in feet. Per patient they typically drink water and coffee with cream. Reviewed alternative beverages to help improve glycemic control. Per patient they typically eat 3 meals/day and snakcs. Educated re: effects of carbohydrates on blood glucose, the \"plate method\" of healthy meal planning, basics of healthy meal plan, Consistent Carbohydrate Diet, discussed the basics of carb counting and how to read a nutrition label. Educated patient regarding the benefits of physical activity (as cleared by provider) on glycemic control. Also explained the relationship between hyperglycemia and infection and delayed healing. Discussed target goals for blood glucose and A1C. Discussed the relationship between hyperglycemia and steroids and that as steroids are decreased insulin dose may need to be adjusted.  Educated patient regarding diabetic medications including mechanism of action, timing, and possible side effects. Patient verbalizes understanding of teaching. Explained the importance of blood glucose monitoring to patient and how this will provide their primary care provider with more information to help them safely titrate their regimen. Recommended frequency of qid and to record in log book to take to primary care provider appointment. Demonstrated how to use a blood glucose meter, care of strips, and sharps disposal. Educated patient that all glucometers are similar but differ in small ways. Educated patient that they should try to get the glucometer covered by their insurance formulary to keep their costs down. Educated patient to seek out affordable glucometer options that exist at some stores or drug stores if they are ever uninsured. Patient was able to return demonstrate correct use of meter. Patient will need prescription for glucometer and glucometer supplies at discharge so that the patient may obtain the meter covered by their insurance. Educated patient regarding current basal/bolus regimen of Lantus 20 units daily and Regular insulin sliding scale coverage 4x/day ac and hs,  including type of insulin, timing with meals, onset, duration of effect, and peak of insulin dose. Instructed patient to always seek guidance from their primary care provider about adjusting their insulin doses and not to adjust them on their own as this could negatively impact their glycemic control or result in hypoglycemia. Patient verbalizes understanding. Also discussed  Metformin 500 mg BID with meals. Patient instructed on the preparation and injection of insulin dose via vial and syringe method. Patient returned demonstrated proper insulin injection technique using injection model, syringe, and vial of saline. Nursing will continue to have patient practice insulin self-injection when insulin dose is due and document progress or refusals of insulin practice in progress notes.  Patient verbalized understanding of site rotation, storage of insulin, and proper sharps disposal. Patient was also instructed in proper use of insulin pens. Patient was able to return demonstrate proper use of insulin pen using injection model and saline demo pen. Patient prefers insulin pens. Patient will need prescription for insulin pens at discharge. Stressed the importance of follow up care for diabetes management with PCP Dr. Dre Keller and to bring blood glucose log book to titrate insulin regimen. Pt would likely benefit from further outpatient diabetes education and pt and wife would like to attend. Referral sent to [facility] diabetes center. Pt and wife have no further questions regarding diabetes management at this time.

## 2020-11-04 NOTE — INTERDISCIPLINARY ROUNDS
Interdisciplinary team rounds were held 11/4/2020 with the following team members:Care Management, Nursing, Occupational Therapy and Physician and the patient and family. Plan of care discussed. See clinical pathway and/or care plan for interventions and desired outcomes. Pulmonary following. Dr Abby Covarrubias would like overnight pulse ox.

## 2020-11-04 NOTE — PROGRESS NOTES
11/03/20 2211   Oxygen Therapy   O2 Sat (%) 96 %   Pulse via Oximetry 81 beats per minute   O2 Device BIPAP   FIO2 (%) 40 %   Respiratory   Respiratory (WDL) X   Chest/Tracheal Assessment Chest expansion, symmetrical   Breath Sounds Bilateral Diminished;Clear   CPAP/BIPAP   CPAP/BIPAP Start/Stop On   Device Mode BIPAP   Mask Type and Size Full face   Skin Condition Intact   PIP Observed 19 cm H20   IPAP (cm H2O) 18 cm H2O   EPAP (cm H2O) 10 cm H2O   Inspiratory Time (sec) 1 seconds   Vt Spont (ml) 741 ml   Ve Observed (l/min) 15.4 l/min   Backup Rate 10   Total RR (Spontaneous) 21 breaths per minute   Insp Rise Time (sec) 3   Leak (Estimated) 11 L/min   Pt's Home Machine No   Biomedical Check Performed Yes   Settings Verified Yes   Alarm Settings   High Pressure 30   Low Pressure 5   Apnea 20   Low Ve 4   High Rate 50   Low Rate 8   Pulmonary Toilet   Pulmonary Toilet H. O.B elevated

## 2020-11-04 NOTE — CONSULTS
Comprehensive Nutrition Assessment    Type and Reason for Visit: Consult  Consult for diet education (Hospitalist)    Nutrition Recommendations/Plan:    Basic diabetes education completed with pt and wife. Encourage outpatient education for ongoing knowledge and behavior modification for long term success. Malnutrition Assessment:  Malnutrition Status: No malnutrition    Nutrition Assesment: presented to the ED for hypoxia and shortness of breathe. Admitted with severe chronic respiratory failure secondary to hypoventilation syndrome, DM II discovered with A1C 10.7         Nutrition Related Findings:      Recall of intake per pt and wife most appropriate within guidelines for consistent cho intake when eating at home. Pt reports struggles with eating out more often than they realize relating to portion sizes and foods selections when dining out. Both with appropriate questions regarding cho sources, portions, meal intake. Pt reports + motivation to make lifestyle changes. They have tried various diet methods from Fat Belly Busters to Keto. Pt relates he feels the most rested in years after bipap last PM.  Both speak of making lifestyle modifications including food modifications and physical activity.      Current Nutrition Therapies:  DIET DIABETIC CONSISTENT CARB    Current Intake: Average Meal Intake: % Average Supplement Intake: None ordered      Anthropometric Measures:  · Height: 5' 7\" (170.2 cm)  · Current Body Wt: 131.1 kg (289 lb 0.4 oz), Weight source: Bed scale  · Admission Body Wt: 289 lb 0.4 oz  · Usual Body Wt: 122.5 kg (270 lb), Percent weight change: 7 weight gain occurred over the past year  · Ideal Body Wt: 148 lbs (67 lbs), 195.3 %  · BMI: 45.3, Obese class 3 (BMI 40.0 or greater)    Estimated Daily Nutrient Needs:  Energy (kcal): 9399-0938(81-98 kcal/kg) (Kcal/kg, Weight Used: Current)  Protein (g): 79-98(20% kcal) Weight Used: (Other (specify))    Nutrition Diagnosis:   · Food & nutrition-related knowledge deficit related to (diabetes) as evidenced by (new diagnosis with limited exposure to information)    Nutrition Interventions:   Food and/or Nutrient Delivery: Continue current diet  Nutrition Education/Counseling: Education completed  Coordination of Nutrition Care: Continue to monitor while inpatient    Goals:    Name 3 foods containing cho    Nutrition Monitoring and Evaluation:   Behavioral-Environmental Outcomes: Knowledge or skill  Food/Nutrient Intake Outcomes: Food and nutrient intake  Physical Signs/Symptoms Outcomes: Biochemical data    Discharge Planning:    Recommend pursue outpatient nutrition counseling    Amanda Schmitz MA, RD, LDN on 11/4/2020 at 5:46 PM  Contact: [phone]

## 2020-11-04 NOTE — PROGRESS NOTES
ELFEGO spoke with Ray with [facility] this morning regarding patient needs. Bonifacio Sorenson stated he was waiting on the ABG to get the paperwork in for insurance.

## 2020-11-04 NOTE — DISCHARGE INSTR - DIET
Discharge Nutrition Plan: Outpatient Diabetes Education Counseling recommended to patient- more information at [phone].

## 2020-11-04 NOTE — PROGRESS NOTES
PULMONARY/[facility] ICU Daily Progress:  11/4/2020     This patient has been seen and evaluated at the request of Dr. Seda Carvalho. Patient is a  male who was seen at [facility] today by Dr Dorothea Hoang, where he was noted to by hypoxic. Polycythemia noted and poorly controlled DM. He does not take any current medications. He reports that he is a former \"social\" smoker with the occassional cigar. No Known lung disease. He reports second hand smoke growing up. States he has had about a 10 lb weight gain over the past 6 months-1 year. His wife reports that they have slept in different rooms for approx 1 year, as he \"snores and jerks\". She reports noticing apneic periods as well. A CT chest was completed, which showed slight atelectatic areas, no pleural effusions, or concerns for PNA. He was hypercapnic with pH 7.24, pCO2 94.8, PO2 306, HCO3 40.2. He was placed on BiPAP to assist with CO2 levels. We were consulted for hypercapnic respiratory failure with acute on chronic hypoxemia. Subjective:     Much better with the BIPAP. Ready to go home  DM education here      Review of Systems:  -Fever  -Headaches  -Chest pain  +Dyspnea (less), -wheezing, -cough  -Abdominal pain, -constipation  -Leg swelling  All other organ systems grossly normal.        No past surgical history on file.    Social History     Tobacco Use    Smoking status: Current Some Day Smoker     Types: Cigars    Smokeless tobacco: Never Used   Substance Use Topics    Alcohol use: Not Currently      Family History   Problem Relation Age of Onset    Hypertension Mother       No Known Allergies   None       MEDS SCHEDULED:    Current Facility-Administered Medications   Medication Dose Route Frequency    insulin glargine (LANTUS) injection 20 Units  20 Units SubCUTAneous DAILY    methylPREDNISolone (PF) (Solu-MEDROL) injection 40 mg  40 mg IntraVENous Q12H    albuterol-ipratropium (DUO-NEB) 2.5 MG-0.5 MG/3 ML  3 mL Nebulization BID RT    budesonide (PULMICORT) 500 mcg/2 ml nebulizer suspension  500 mcg Nebulization BID RT    sodium chloride (NS) flush 5-40 mL  5-40 mL IntraVENous Q8H    sodium chloride (NS) flush 5-40 mL  5-40 mL IntraVENous PRN    enoxaparin (LOVENOX) injection 40 mg  40 mg SubCUTAneous Q12H    insulin regular (NOVOLIN R, HUMULIN R) injection   SubCUTAneous AC&HS         Objective:     Vitals:    11/04/20 0410 11/04/20 0740 11/04/20 0742 11/04/20 0800   BP: 126/71   (!) 146/81   Pulse: (!) 59   90   Resp: 18   18   Temp: 97.8 °F (36.6 °C)   98.8 °F (37.1 °C)   SpO2: 98% (!) 86% 92% 94%   Weight:       Height:         No intake/output data recorded. 11/02 1901 - 11/04 0700  In: 240 [P.O.:240]  Out: 150 [Urine:150]      PHYSICAL EXAM     Constitutional:  the patient is well developed and in no acute distress on 3 LPM  EENMT:  Sclera clear, pupils equal, oral mucosa moist, narrow airway nares patient  Respiratory: Clear to auscultation bilaterally  Cardiovascular:  RRR without M,G,R  Gastrointestinal: soft and non-tender; with positive bowel sounds. Musculoskeletal: warm without cyanosis. There is no lower leg edema.      Skin:  no jaundice or rashes, no wounds   Neurologic: no gross neuro deficits, alert and oriented x 3    CT:  11/2/2020      CULTURES:  Results     Procedure Component Value Units Date/Time    CULTURE, BLOOD [[id number]] Collected:  11/02/20 1131    Order Status:  Completed Specimen:  Blood Updated:  11/04/20 0654     Special Requests: --        RIGHT  FOREARM       Culture result: NO GROWTH 2 DAYS       CULTURE, BLOOD [[id number]] Collected:  11/02/20 1037    Order Status:  Completed Specimen:  Blood Updated:  11/04/20 0654     Special Requests: --        LEFT  Antecubital       Culture result: NO GROWTH 2 DAYS              ECHO:  none    PFTs:   PFT Results  (Last 3 results in the past 10 years)    None          LABS    Recent Labs     11/03/20  0314 11/02/20  1035   WBC 7.7 8.3   HGB 16.3 17.5*   HCT 54.3* 56.8*   * 152     Recent Labs     11/03/20  0314 11/02/20  1035    135*   K 4.5 5.4*    94*   * 257*   CO2 36* 39*   BUN 11 11   CREA 0.93 1.09   MG 2.3  --    PHOS 3.8*  --        Recent Labs     11/03/20  0311 11/02/20  1803 11/02/20  1433   PHI 7.32* 7.30* 7.22*   PCO2I 70.5* 75.2* 92.5*   PO2I 89 73* 92   HCO3I 36.6* 37.1* 37.7*     Recent Labs     11/02/20  1035   LAC 0.9       No results for input(s): PH, PCO2, PO2, HCO3 in the last 72 hours. Assessment:     Hospital Problems  Date Reviewed: 11/2/2020          Codes Class Noted POA    Mediastinal adenopathy (Chronic) ICD-10-CM: R59.0  ICD-9-CM: 785.6  11/3/2020 Yes        Obesities, morbid ([address] (Chronic) ICD-10-CM: E66.01  ICD-9-CM: 278.01  11/3/2020 Yes        Tobacco dependence (Chronic) ICD-10-CM: F17.200  ICD-9-CM: 305.1  11/3/2020 Yes        Hypoventilation associated with obesity syndrome (HCC) (Chronic) ICD-10-CM: N76.9  ICD-9-CM: 278.03  11/2/2020 Yes        * (Principal) Acute respiratory failure with hypoxia and hypercapnia (HCC) ICD-10-CM: J96.01, J96.02  ICD-9-CM: 518.81  11/2/2020 Yes        Polycythemia (Chronic) ICD-10-CM: D75.1  ICD-9-CM: 238.4  11/2/2020 Yes            Patient with smoker history/obesity came in with hypercapnic respiratory failure. Also with polycythemia. Due to the severity and nature of this patient's chronic respiratory failure, patient requires noninvasive ventilation with volume support. Home Bipap insufficient due to severity of patient's condition. This patient requires access to ventilator therapy for nocturnal and daytime use to reduce risk of exacerbation secondary to OHS. Absence of noninvasive ventilator therapy on a daily basis will quickly lead to severe injury or death from the resulting CO2 retention without this level of respiratory support. Plan:     -- repeat ABG this am  -- bedside PFTs ordered  -- he does not smoke  -- Assess O2 needs.  On 3 lPM now and did not use at home. Will need   --continue nebs   --social work consult for BiPAP after discharge  --will need outpatient f/u for lymphadenopathy. Aware can have risk for CA given long term smoking history. Ollie Norwood NP     Lungs: No wheezing  Heart:  RRR with no Murmur/Rubs/Gallops. PFT suggest restrictive pattern    Additional Comments: Will get NICOLLE tonight and ask SW to assist in BIPAP post discharge with O2. Will need PSG later and sleep follow up    I have spoken with and examined the patient. I agree with the above assessment and plan as documented. Adams Sanabria MD        More than 50% of time documented was spent in face-to-face contact with the patient and in the care of the patient on the floor/unit where the patient is located.

## 2020-11-04 NOTE — CONSULTS
Hospitalist Consult    NAME:  Tonie Epps   Age:  61 y.o.  :   1960   MRN:   [id number]  PCP: Mary Ann Hurtado MD  Consulting MD:  Treatment Team: Attending Provider: Dionne Huddleston DO; Consulting Provider: Bridgette Gerard MD; Utilization Review: Ric Rodriguez Consulting Provider: David Santana MD; Care Manager: Jc Blake RN    Chief Complaint   Patient presents with    Shortness of Breath         HPI:   Patient is a 61 y.o. male who presented to the ED for hypoxia and shortness of breathe. CT chest was completed, which showed slight atelectatic areas, no pleural effusions, or concerns for PNA. It did show a slightly enlarged AP window lymph node. Initial ABG on non rebreather showed pH 7.24, pCO2 94.8, PO2 306, HCO3 40.2. Repeat ABG on 4 LPM NC in the ER showed PH 7.21, pCO2 95.5, PO2 92, HCO3 37.7. Pulmonology admitted for long term BiPAP placement. It was determined his severe chronic respiratory failure was secondary to hypoventilation syndrome. We are trying to qualify for BiPAP at home. Respiratory therapy has ambulated and will nee 3L NC at rest along with 4L NC during ambulation. ECHO showed EF 55%, no shunting noted. DM type II also discovered. A1C 10.7. Glucose elevated today but likely from his IV steroids. No significant past medical hx or surgical hx due to poor follow up with healthcare providers. NO chest pain or SOB. Wanting to go home.      Family History   Problem Relation Age of Onset    Hypertension Mother        Social History     Social History Narrative    Not on file        Social History     Tobacco Use    Smoking status: Current Some Day Smoker     Types: Cigars    Smokeless tobacco: Never Used   Substance Use Topics    Alcohol use: Not Currently        Social History     Substance and Sexual Activity   Drug Use Not on file         No Known Allergies    Prior to Admission medications    Not on File           Review of Systems    Constitutional:  NAD  Eyes: PERRLA, accomodation symmetric, sclera not icteric, conjunctivae clear  Ears, nose, mouth, throat, and face: normocephalic, no thrush, lesions or exudate. Moist mucous membranes  Respiratory: no SOB, FRANK or hemoptysis. No chronic cough  Cardiovascular: no CP, palpitations or SOB  Gastrointestinal: no abdominal pain, nausea, vomiting or diarrhea. No constipation. No hematemesis, hematochezia or BRBPR  Genitourinary:no urgency, frequency or dysuria, no nocturia  Integument/breast: no skin rashes, lesions  Hematologic/lymphatic: negative for known bleeding disorders  Musculoskeletal:negative for joint pain, joint tenderness  Neurological: negative for dizziness lightheadedness, syncopal or presyncopal events, no seizure or h/o CVA  Behavioral/Psych: no depression, anxiety or suicidal ideation  Endocrine: negative for polydipsea, polyuria or intolerance to heat or cold  Allergic/Immunologic: negative for allergic responses      Objective:     Visit Vitals  BP (!) 146/81 (BP 1 Location: Right arm, BP Patient Position: Sitting)   Pulse 90   Temp 98.8 °F (37.1 °C)   Resp 18   Ht 5' 7\" (1.702 m)   Wt 131.1 kg (289 lb 0.4 oz)   SpO2 94%   BMI 45.27 kg/m²        No intake/output data recorded. 11/02 1901 - 11/04 0700  In: 240 [P.O.:240]  Out: 150 [Urine:150]    Data Review:   Recent Results (from the past 24 hour(s))   GLUCOSE, POC    Collection Time: 11/03/20 10:52 AM   Result Value Ref Range    Glucose (POC) 185 (H) 65 - 100 mg/dL   GLUCOSE, POC    Collection Time: 11/03/20  4:10 PM   Result Value Ref Range    Glucose (POC) 297 (H) 65 - 100 mg/dL   GLUCOSE, POC    Collection Time: 11/03/20  8:24 PM   Result Value Ref Range    Glucose (POC) 324 (H) 65 - 100 mg/dL   GLUCOSE, POC    Collection Time: 11/04/20  7:14 AM   Result Value Ref Range    Glucose (POC) 246 (H) 65 - 100 mg/dL       Physical Exam:     General:  Alert, cooperative, no distress, appears stated age.    Eyes: Conjunctivae/corneas clear. Ears:  Normal TMs and external ear canals both ears. Nose: Nares normal. Septum midline. Mouth/Throat: Lips, mucosa, and tongue normal, large neck. Neck:  no JVD. Back:   deferred   Lungs:   Clear to auscultation bilaterally. Heart:  Regular rate and rhythm, S1, S2 normal   Abdomen:   Soft, non-tender. Bowel sounds normal. Obese. Extremities: Extremities normal, atraumatic, no cyanosis or edema. Pulses: 2+ and symmetric all extremities. Skin: Skin color, texture, turgor normal. No rashes or lesions   Lymph nodes: Cervical, supraclavicular, and axillary nodes normal.   Neurologic: CNII-XII intact. Assessment and Plan     Principal Problem:    Acute respiratory failure with hypoxia and hypercapnia (HCC) (11/2/2020)    Active Problems:    Hypoventilation associated with obesity syndrome ([address] (11/2/2020)      Hyperkalemia (11/2/2020)      Polycythemia (11/2/2020)      Mediastinal adenopathy (11/3/2020)      Obesities, morbid ([address] (11/3/2020)      Tobacco dependence (11/3/2020)    Acute respiratory failure with hypoxia and hypercapnia - We are trying to qualify for BiPAP. ABG performed, PFT performed, and will order overnight pulse ox on BiPAP with 3L equivalent tonight. I have spoken to RT who will place order. duoneb BID, Pulmicort BID. DM type II - Stop IV steroids since not helping glucose levels and no wheezing on exam. DM education has seen. Will start metformin low dose, lantus 20 units daily and SS. Trend glucose levels. If qualifies for BiPAP and glucose levels improved, will dc tomorrow.      DVT prophylaxis - Lovenox   Signed By: Clint Chowdhury DO   November 4, 2020

## 2020-11-04 NOTE — PROGRESS NOTES
ELFEGO [facility] with [facility] with [facility] regarding [facility] Bipap vs. Trilogy machine. Leoncio Ruiz stated he has already started working on this and the patient is to be on Bipap this evening to see how he responds. ELFEGO will continue to follow up with Leoncio Ruiz regarding needs.

## 2020-11-05 VITALS
TEMPERATURE: 99 F | WEIGHT: 289.02 LBS | BODY MASS INDEX: 45.36 KG/M2 | RESPIRATION RATE: 18 BRPM | OXYGEN SATURATION: 90 % | SYSTOLIC BLOOD PRESSURE: 138 MMHG | HEIGHT: 67 IN | DIASTOLIC BLOOD PRESSURE: 78 MMHG | HEART RATE: 73 BPM

## 2020-11-05 LAB
GLUCOSE BLD STRIP.AUTO-MCNC: 114 MG/DL (ref 65–100)
GLUCOSE BLD STRIP.AUTO-MCNC: 131 MG/DL (ref 65–100)

## 2020-11-05 PROCEDURE — 74011250637 HC RX REV CODE- 250/637: Performed by: FAMILY MEDICINE

## 2020-11-05 PROCEDURE — 99232 SBSQ HOSP IP/OBS MODERATE 35: CPT | Performed by: INTERNAL MEDICINE

## 2020-11-05 PROCEDURE — 74011636637 HC RX REV CODE- 636/637: Performed by: FAMILY MEDICINE

## 2020-11-05 PROCEDURE — 82962 GLUCOSE BLOOD TEST: CPT

## 2020-11-05 PROCEDURE — 74011250636 HC RX REV CODE- 250/636: Performed by: INTERNAL MEDICINE

## 2020-11-05 RX ORDER — IPRATROPIUM BROMIDE AND ALBUTEROL SULFATE 2.5; .5 MG/3ML; MG/3ML
3 SOLUTION RESPIRATORY (INHALATION) EVERY 12 HOURS
Qty: 60 NEBULE | Refills: 0 | Status: SHIPPED | OUTPATIENT
Start: 2020-11-05 | End: 2020-12-16

## 2020-11-05 RX ORDER — BUDESONIDE 0.5 MG/2ML
500 INHALANT ORAL 2 TIMES DAILY
Qty: 60 EACH | Refills: 0 | Status: SHIPPED | OUTPATIENT
Start: 2020-11-05 | End: 2020-12-16

## 2020-11-05 RX ORDER — INSULIN LISPRO 100 [IU]/ML
INJECTION, SOLUTION INTRAVENOUS; SUBCUTANEOUS
Qty: 2 PEN | Refills: 0 | Status: SHIPPED | OUTPATIENT
Start: 2020-11-05

## 2020-11-05 RX ORDER — INSULIN GLARGINE 100 [IU]/ML
20 INJECTION, SOLUTION SUBCUTANEOUS DAILY
Qty: 2 PEN | Refills: 1 | Status: SHIPPED | OUTPATIENT
Start: 2020-11-05

## 2020-11-05 RX ORDER — NEBULIZER AND COMPRESSOR
EACH MISCELLANEOUS
Qty: 1 EACH | Refills: 0 | Status: SHIPPED | OUTPATIENT
Start: 2020-11-05

## 2020-11-05 RX ORDER — METFORMIN HYDROCHLORIDE 500 MG/1
500 TABLET ORAL 2 TIMES DAILY WITH MEALS
Qty: 60 TAB | Refills: 0 | Status: SHIPPED | OUTPATIENT
Start: 2020-11-05

## 2020-11-05 RX ADMIN — ENOXAPARIN SODIUM 40 MG: 40 INJECTION SUBCUTANEOUS at 09:03

## 2020-11-05 RX ADMIN — Medication 5 ML: at 05:04

## 2020-11-05 RX ADMIN — INSULIN GLARGINE 20 UNITS: 100 INJECTION, SOLUTION SUBCUTANEOUS at 09:03

## 2020-11-05 RX ADMIN — METFORMIN HYDROCHLORIDE 500 MG: 500 TABLET ORAL at 09:03

## 2020-11-05 NOTE — PROGRESS NOTES
Care Management Interventions  PCP Verified by CM: Yes(Dr. Suzanne Lucero)  Mode of Transport at Discharge: Self  Transition of Care Consult (CM Consult): Discharge Planning  Discharge Durable Medical Equipment: Yes(Home O2, Bipap)  Physical Therapy Consult: No  Occupational Therapy Consult: No  Current Support Network: Own Home, Lives with Spouse  Confirm Follow Up Transport: Self  [facility] Resource Information Provided?: No  Discharge Location  Discharge Placement: Home    Patient to discharge home today. Patient required Home O2 and a Bipap. DME provided by [facility]. Patient will transport home with his wife. All milestones for discharge have been met.

## 2020-11-05 NOTE — PROGRESS NOTES
Patient placed on continuous sat monitor for overnight oximetry study on BIPAP with 32%. RN notified. Overnight study placed in patient's chart.

## 2020-11-05 NOTE — DISCHARGE SUMMARY
Hospitalist Discharge Summary     Patient ID:  Carl Everett  [id number]  48 y.o.  1960  Admit date: 11/2/2020 10:15 AM  Discharge date and time: 11/5/2020  Attending: Celio Gongora DO  PCP:  Efren Watters MD  Treatment Team: Attending Provider: Celio Gongora DO; Consulting Provider: Carmenza Mosqueda MD; Utilization Review: Amirah Sanabria Consulting Provider: Pepe Gonzalez MD; Care Manager: Nathalia Trejo RN; Charge Nurse: Cheng Gaines RN    Principal Diagnosis Acute respiratory failure with hypoxia and hypercapnia [facility])   Principal Problem:    Acute respiratory failure with hypoxia and hypercapnia ([facility] [address] (11/2/2020)    Active Problems:    Hypoventilation associated with obesity syndrome ([facility] [address] (11/2/2020)      Polycythemia (11/2/2020)      Mediastinal adenopathy (11/3/2020)      Obesities, morbid ([facility] [address] (11/3/2020)      Tobacco dependence (11/3/2020)     Hospital Course: 61 y.o. male who presented to the ED for hypoxia and shortness of breathe. CT chest was completed, which showed slight atelectatic areas, no pleural effusions, or concerns for PNA.  It did show a slightly enlarged AP window lymph node. Initial ABG on non rebreather showed pH 7.24, pCO2 94.8, PO2 306, HCO3 40.2.  Repeat ABG on 4 LPM NC in the ER showed PH 7.21, pCO2 95.5, PO2 92, HCO3 37.7. Pulmonology admitted for long term BiPAP placement. It was determined his severe chronic respiratory failure was secondary to hypoventilation syndrome. We are qualifying for BiPAP at home with 3L NC at night. Respiratory therapy has ambulated and will need 3L NC at rest along with 4L NC during ambulation. ECHO showed EF 55%, no shunting noted. Have written for nebulizer machine as well. Will need to follow up with [place] [facility]ology as outpatient for formal sleep study.      DM type II also discovered. A1C 10.7. DM education has seen. Will send home with insulin as below.  Have hand written for glucometer, testing strips, lancets. If SOB, AMS, poor PO intake, please come back to the ED    Please refer to the admission H&P for details of presentation. In summary, the patient is stable for discharge. Significant Diagnostic Studies:       Labs: Results:       Chemistry Recent Labs     11/03/20 0314 11/02/20  1035   * 257*    135*   K 4.5 5.4*    94*   CO2 36* 39*   BUN 11 11   CREA 0.93 1.09   CA 8.5 9.1   AGAP 3* 2*   AP  --  67   TP  --  7.9   ALB  --  3.5   GLOB  --  4.4*   AGRAT  --  0.8*      CBC w/Diff Recent Labs     11/03/20 0314 11/02/20  1035   WBC 7.7 8.3   RBC 5.52 5.89*   HGB 16.3 17.5*   HCT 54.3* 56.8*   * 152   GRANS 74 77   LYMPH 16 15   EOS 2 2      Cardiac Enzymes No results for input(s): CPK, CKND1, ESTELLE in the last 72 hours. No lab exists for component: CKRMB, TROIP   Coagulation No results for input(s): PTP, INR, APTT, INREXT in the last 72 hours. Lipid Panel No results found for: CHOL, CHOLPOCT, CHOLX, CHLST, CHOLV, 508320, HDL, HDLP, LDL, LDLC, DLDLP, 527396, VLDLC, VLDL, TGLX, TRIGL, TRIGP, TGLPOCT, CHHD, CHHDX   BNP No results for input(s): BNPP in the last 72 hours. Liver Enzymes Recent Labs     11/02/20  1035   TP 7.9   ALB 3.5   AP 67      Thyroid Studies Lab Results   Component Value Date/Time    TSH 2.370 11/02/2020 10:35 AM            Discharge Exam:  Visit Vitals  /73 (BP 1 Location: Right arm, BP Patient Position: Sitting)   Pulse 61   Temp 98.7 °F (37.1 °C)   Resp 18   Ht 5' 7\" (1.702 m)   Wt 131.1 kg (289 lb 0.4 oz)   SpO2 90%   BMI 45.27 kg/m²     General appearance: alert, cooperative, no distress, appears stated age  Lungs: clear to auscultation bilaterally  Heart: regular rate and rhythm, S1, S2 normal, no murmur, click, rub or gallop  Abdomen: soft, non-tender. Bowel sounds normal. Obese.    Extremities: no cyanosis or edema  Neurologic: Grossly normal    Disposition:Home   Discharge Condition: stable  Patient Instructions: As above Current Discharge Medication List      START taking these medications    Details   metFORMIN (GLUCOPHAGE) 500 mg tablet Take 1 Tab by mouth two (2) times daily (with meals). Qty: 60 Tab, Refills: 0      insulin glargine (LANTUS,BASAGLAR) 100 unit/mL (3 mL) inpn 20 Units by SubCUTAneous route daily. Qty: 2 Pen, Refills: 1      Nebulizer & Compressor machine To use twice daily  Qty: 1 Each, Refills: 0      albuterol-ipratropium (DUO-NEB) 2.5 mg-0.5 mg/3 ml nebu 3 mL by Nebulization route every twelve (12) hours. Qty: 60 Nebule, Refills: 0      budesonide (PULMICORT) 0.5 mg/2 mL nbsp 2 mL by Nebulization route two (2) times a day. Qty: 60 Each, Refills: 0      insulin lispro (HUMALOG) 100 unit/mL kwikpen Less than 150 =   0 units           150 -199 =   2 units  200 -249 =   4 units  250 -299 =   6 units  300 -349 =   8 units  Before meals and at night  Qty: 2 Pen, Refills: 0             Activity:Up and brice   Diet:ADA  Wound Care:None     Follow-up PCP in one week. [facility] in two weeks.   ·     Time spent to discharge patient 35 minutes  Signed:  Romina Ghosh DO  11/5/2020  10:22 AM

## 2020-11-05 NOTE — PROGRESS NOTES
Problem: Diabetes Self-Management  Goal: *Disease process and treatment process  Description: Define diabetes and identify own type of diabetes; list 3 options for treating diabetes. Outcome: Progressing Towards Goal  Goal: *Using medications safely  Description: State effect of diabetes medications on diabetes; name diabetes medication taking, action and side effects. Outcome: Progressing Towards Goal  Goal: *Monitoring blood glucose, interpreting and using results  Description: Identify recommended blood glucose targets  and personal targets. Outcome: Progressing Towards Goal     Problem: Falls - Risk of  Goal: *Absence of Falls  Description: Document Karan Gordillo Fall Risk and appropriate interventions in the flowsheet.   Outcome: Progressing Towards Goal  Note: Fall Risk Interventions:            Medication Interventions: Teach patient to arise slowly

## 2020-11-05 NOTE — PROGRESS NOTES
Hourly rounds done. Pt denies pain, nausea, vomiting. BiPAP thru night, Overnight Study completed. Pt eager to DC home. All needs met at this time.

## 2020-11-05 NOTE — INTERDISCIPLINARY ROUNDS
Interdisciplinary team rounds were held 11/5/2020 with the following team members:Care Management, Nursing, Occupational Therapy and Physician and the patient and family. Plan of care discussed. See clinical pathway and/or care plan for interventions and desired outcomes. Pt discharging home with O2 and trilogy. Pt needs sleep study OP.

## 2020-11-05 NOTE — PROGRESS NOTES
PULMONARY/CCM Daily Progress:  11/5/2020     This patient has been seen and evaluated at the request of Dr. Harriett Abdi. Patient is a  male who was seen at [facility] today by Dr Bethany Plata, where he was noted to by hypoxic. Polycythemia noted and poorly controlled DM. He does not take any current medications. He reports that he is a former \"social\" smoker with the occassional cigar. No Known lung disease. He reports second hand smoke growing up. States he has had about a 10 lb weight gain over the past 6 months-1 year. His wife reports that they have slept in different rooms for approx 1 year, as he \"snores and jerks\". She reports noticing apneic periods as well. A CT chest was completed, which showed slight atelectatic areas, no pleural effusions, or concerns for PNA. He was hypercapnic with pH 7.24, pCO2 94.8, PO2 306, HCO3 40.2. He was placed on BiPAP to assist with CO2 levels. We were consulted for hypercapnic respiratory failure with acute on chronic hypoxemia. Subjective:     Patient ready to \"go home\", will be discharged today  Had NICOLLE on BIPAP last night, O2 Low 74%; PFT revealed FVC 46%, FEV1 50%  Will need O2 at 3L NC with rest, 4L with exertion    No past surgical history on file.    Social History     Tobacco Use    Smoking status: Current Some Day Smoker     Types: Cigars    Smokeless tobacco: Never Used   Substance Use Topics    Alcohol use: Not Currently      Family History   Problem Relation Age of Onset    Hypertension Mother       No Known Allergies   None     MEDS SCHEDULED:    Current Facility-Administered Medications   Medication Dose Route Frequency    insulin glargine (LANTUS) injection 20 Units  20 Units SubCUTAneous DAILY    metFORMIN (GLUCOPHAGE) tablet 500 mg  500 mg Oral BID WITH MEALS    albuterol-ipratropium (DUO-NEB) 2.5 MG-0.5 MG/3 ML  3 mL Nebulization BID RT    budesonide (PULMICORT) 500 mcg/2 ml nebulizer suspension  500 mcg Nebulization BID RT    sodium chloride (NS) flush 5-40 mL  5-40 mL IntraVENous Q8H    sodium chloride (NS) flush 5-40 mL  5-40 mL IntraVENous PRN    enoxaparin (LOVENOX) injection 40 mg  40 mg SubCUTAneous Q12H    insulin regular (NOVOLIN R, HUMULIN R) injection   SubCUTAneous AC&HS     Objective:     Vitals:    11/04/20 2308 11/05/20 0436 11/05/20 0741 11/05/20 0844   BP: (!) 152/80 (!) 163/89 124/73    Pulse: 68 65 61    Resp: 18 18 18    Temp: 98.6 °F (37 °C) 98.4 °F (36.9 °C) 98.7 °F (37.1 °C)    SpO2: 99% 97% 92% 90%   Weight:       Height:         No intake/output data recorded. No intake/output data recorded. PHYSICAL EXAM     Constitutional:  the patient is well developed and in no acute distress   EENMT:  Sclera clear, pupils equal, oral mucosa moist, narrow airway nares patient  Respiratory: Clear to auscultation bilaterally, on 3L O2  Cardiovascular:  RRR without M,G,R  Gastrointestinal: soft and non-tender; with positive bowel sounds. Musculoskeletal: warm without cyanosis. There is no lower leg edema.      Skin:  no jaundice or rashes, no wounds   Neurologic: no gross neuro deficits, alert and oriented x 3    CT:  11/2/2020      CULTURES:  Results     Procedure Component Value Units Date/Time    CULTURE, BLOOD [[id number]] Collected:  11/02/20 1131    Order Status:  Completed Specimen:  Blood Updated:  11/05/20 0734     Special Requests: --        RIGHT  FOREARM       Culture result: NO GROWTH 3 DAYS       CULTURE, BLOOD [[id number]] Collected:  11/02/20 1037    Order Status:  Completed Specimen:  Blood Updated:  11/05/20 0734     Special Requests: --        LEFT  Antecubital       Culture result: NO GROWTH 3 DAYS              PFTs:   PFT Results  (Last 3 results in the past 10 years)    None        Recent Labs     11/03/20  0314   WBC 7.7   HGB 16.3   HCT 54.3*   *     Recent Labs     11/03/20  0314      K 4.5      *   CO2 36*   BUN 11   CREA 0.93   MG 2.3   PHOS 3.8*     Recent Labs 11/04/20  1215 11/03/20  0311 11/02/20  1803   PHI 7.37 7.32* 7.30*   PCO2I 55.1* 70.5* 75.2*   PO2I 57* 89 73*   HCO3I 31.7* 36.6* 37.1*     No results for input(s): LCAD, LAC in the last 72 hours. No results for input(s): PH, PCO2, PO2, HCO3 in the last 72 hours. Assessment:     Hospital Problems  Date Reviewed: 11/2/2020          Codes Class Noted POA    Mediastinal adenopathy (Chronic) ICD-10-CM: R59.0  ICD-9-CM: 785.6  11/3/2020 Yes        Obesities, morbid ([address] (Chronic) ICD-10-CM: E66.01  ICD-9-CM: 278.01  11/3/2020 Yes        Tobacco dependence (Chronic) ICD-10-CM: F17.200  ICD-9-CM: 305.1  11/3/2020 Yes        Hypoventilation associated with obesity syndrome (HCC) (Chronic) ICD-10-CM: N20.2  ICD-9-CM: 278.03  11/2/2020 Yes        * (Principal) Acute respiratory failure with hypoxia and hypercapnia (HCC) ICD-10-CM: J96.01, J96.02  ICD-9-CM: 518.81  11/2/2020 Yes        Polycythemia (Chronic) ICD-10-CM: D75.1  ICD-9-CM: 238.4  11/2/2020 Yes            Patient with smoker history/obesity came in with hypercapnic respiratory failure. Also with polycythemia. Due to the severity and nature of this patient's chronic respiratory failure, patient requires noninvasive ventilation with volume support. Home Bipap insufficient due to severity of patient's condition. This patient requires access to ventilator therapy for nocturnal and daytime use to reduce risk of exacerbation secondary to OHS/COPD. Absence of noninvasive ventilator therapy on a daily basis will quickly lead to severe injury or death from the resulting CO2 retention without this level of respiratory support.      Plan:     --Patient had oxygen qualification completed, will need 3L at rest and 4L with exertion  --Would benefit from having a home Trilogy after discharge given OHS/COPD  --Will need outpatient f/u for lymphadenopathy  --Will need PSG and outpatient sleep follow up  --Plans for discharge home today    Patsy Vincent NP     More than 50% of time documented was spent in face-to-face contact with the patient and in the care of the patient on the floor/unit where the patient is located. Lungs:  Clear bilaterally, 3L  Heart:  RRR with no Murmur/Rubs/Gallops    Additional Comments: Home today with Trilogy and O2. Needs follow up CT in 3 months for lymphadenopathy. Weight loss, Exercise. Will need to follow up in clinic in 3-4 weeks. I have spoken with and examined the patient. I agree with the above assessment and plan as documented.     Kathleen Grigsby MD

## 2020-11-05 NOTE — PROGRESS NOTES
Placed pt on V60 and continuous pulse oximetry for sleep study. Patient it comfortable with no signs of increased WOB. Will continue to monitor. 11/04/20 1944   Oxygen Therapy   O2 Sat (%) 97 %   Pulse via Oximetry 69 beats per minute   O2 Device BIPAP   FIO2 (%) 32 %   Respiratory   Respiratory (WDL) X   Respiratory Pattern Regular   Chest/Tracheal Assessment Chest expansion, symmetrical   Breath Sounds Bilateral Diminished   CPAP/BIPAP   CPAP/BIPAP Start/Stop On   Device Mode BIPAP   Mask Type and Size Full face; Large   Skin Condition intact   PIP Observed 19 cm H20   IPAP (cm H2O) 18 cm H2O   EPAP (cm H2O) 10 cm H2O   Inspiratory Time (sec) 0.9 seconds   Vt Spont (ml) 648 ml   Ve Observed (l/min) 13.2 l/min   Backup Rate 8   Total RR (Spontaneous) 21 breaths per minute   Insp Rise Time (sec) 1   Leak (Estimated) 10 L/min   Pt's Home Machine No   Biomedical Check Performed Yes   Settings Verified Yes   Alarm Settings   High Pressure 30   Low Pressure 5   Apnea 20   Low Ve 4   High Rate 50   Low Rate 10   Pulmonary Toilet   Pulmonary Toilet H. O.B elevated   Back up rate is set to 10 not 8.   Shannon Davidson, RT

## 2020-11-06 ENCOUNTER — PATIENT OUTREACH (OUTPATIENT)
Dept: CASE MANAGEMENT | Age: 60
End: 2020-11-06

## 2020-11-07 LAB
BACTERIA SPEC CULT: NORMAL
BACTERIA SPEC CULT: NORMAL
SERVICE CMNT-IMP: NORMAL
SERVICE CMNT-IMP: NORMAL

## 2020-11-09 NOTE — PROGRESS NOTES
Patient contacted regarding recent visit for viral symptoms. No    Outreach made within 2 business days of discharge: Yes    This Leesa Foymitz contacted the patient by telephone to perform post discharge call. Verified name and  with patient as identifiers. Provided introduction to self, and reason for call due to high risk for COVID-19. Discussed COVID-19 related testing which was not done at this time. Test results were not done. Patient informed of results, if available? Not done    Advance Care Planning:   Does patient have an Advance Directive: Reviewed and current      Discussed exposure protocols and quarantine with CDC Guidelines What To Do If You Are Sick    patient was given an opportunity for questions and concerns. Stay home except to get medical care  Separate yourself from other people and animals in your home  Call ahead before visiting your doctor  Wear a facemask  Cover your coughs and sneezes  Clean your hands often  Avoid sharing personal household items  Clean all high-touch surfaces everyday    Monitor your symptoms  Seek prompt medical attention if your illness is worsening (e.g., difficulty breathing). Before seeking care, call your healthcare provider and tell them that you have, or are being evaluated for, COVID-19. Put on a facemask before you enter the facility. These steps will help the healthcare provider's office to keep other people in the office or waiting room from getting infected or exposed. Ask your healthcare provider to call the local or state health department. Persons who are placed under If you have a medical emergency and need to call 911, notify the dispatch personnel that you have, or are being evaluated for COVID-19. If possible, put on a facemask before emergency medical services arrive. The patient agrees to contact the [facility] exposure line [phone], local health department [facility] and PCP office for questions related to their healthcare.  Author provided contact information for future reference.     Patient/family/caregiver given information for [employer] and agrees to enroll No

## 2020-11-10 PROBLEM — G47.33 OSA ON CPAP: Status: ACTIVE | Noted: 2020-11-10

## 2020-11-10 PROBLEM — Z79.4 TYPE 2 DIABETES MELLITUS WITH HYPERGLYCEMIA, WITH LONG-TERM CURRENT USE OF INSULIN (HCC): Status: ACTIVE | Noted: 2020-11-10

## 2020-11-10 PROBLEM — Z99.89 OSA ON CPAP: Status: ACTIVE | Noted: 2020-11-10

## 2020-11-10 PROBLEM — E11.65 TYPE 2 DIABETES MELLITUS WITH HYPERGLYCEMIA, WITH LONG-TERM CURRENT USE OF INSULIN (HCC): Status: ACTIVE | Noted: 2020-11-10

## 2020-11-10 NOTE — PROCEDURES
[address] [facility]  PROCEDURE NOTE    Name:  Morteza De La Garza  MR#:  [id number]  :  1960  ACCOUNT #:  [de-identified]  DATE OF SERVICE:  2020    SPIROMETRY    FVC -1.92 L (46% predicted). FEV1 - 1.48 L (50% predicted). FEV1/FVC - 0.77. INTERPRETATION:  Spirometry and the flow volume loop indicate a restrictive pattern without significant air flow obstruction. If a true restrictive defect is suspected clinically, complete pulmonary function testing is recommended.         Blank Allison MD JP/[email or web address]/[email or web address]  D:  2020 20:42  T:  2020 23:28  JOB #:  [id number]

## 2020-11-11 NOTE — PROCEDURES
[address] [facility]  PROCEDURE NOTE    Name:  Omid Hernandez  MR#:  [id number]  :  1960  ACCOUNT #:  [de-identified]  DATE OF SERVICE:  2020    SPIROMETRY    INTERPRETATION:  The flow volume loop is consistent with obstruction. However, spirometry suggests restriction.       Belgica Mann MD      [id number]/  D:  11/10/2020 18:54  T:  11/10/2020 20:50  JOB #:  [id number]

## 2020-11-24 ENCOUNTER — HOSPITAL ENCOUNTER (OUTPATIENT)
Dept: SLEEP MEDICINE | Age: 60
Discharge: HOME | End: 2020-11-24
Payer: COMMERCIAL

## 2020-11-24 PROCEDURE — 95811 POLYSOM 6/>YRS CPAP 4/> PARM: CPT

## 2020-12-16 ENCOUNTER — HOSPITAL ENCOUNTER (OUTPATIENT)
Dept: LAB | Age: 60
Discharge: HOME | End: 2020-12-16
Payer: COMMERCIAL

## 2020-12-16 DIAGNOSIS — R59.9 ADENOPATHY: ICD-10-CM

## 2020-12-16 PROBLEM — E66.9 RESTRICTIVE LUNG DISEASE SECONDARY TO OBESITY: Status: ACTIVE | Noted: 2020-12-16

## 2020-12-16 PROBLEM — J98.4 RESTRICTIVE LUNG DISEASE SECONDARY TO OBESITY: Status: ACTIVE | Noted: 2020-12-16

## 2020-12-16 LAB
ANION GAP SERPL CALC-SCNC: 2 MMOL/L (ref 7–16)
BUN SERPL-MCNC: 16 MG/DL (ref 8–23)
CALCIUM SERPL-MCNC: 9.4 MG/DL (ref 8.3–10.4)
CHLORIDE SERPL-SCNC: 101 MMOL/L (ref 98–107)
CO2 SERPL-SCNC: 33 MMOL/L (ref 21–32)
CREAT SERPL-MCNC: 0.86 MG/DL (ref 0.8–1.5)
GLUCOSE SERPL-MCNC: 110 MG/DL (ref 65–100)
POTASSIUM SERPL-SCNC: 4.6 MMOL/L (ref 3.5–5.1)
SODIUM SERPL-SCNC: 136 MMOL/L (ref 138–145)

## 2020-12-16 PROCEDURE — 36415 COLL VENOUS BLD VENIPUNCTURE: CPT

## 2020-12-16 PROCEDURE — 80048 BASIC METABOLIC PNL TOTAL CA: CPT

## 2022-03-18 PROBLEM — E11.65 TYPE 2 DIABETES MELLITUS WITH HYPERGLYCEMIA, WITH LONG-TERM CURRENT USE OF INSULIN (HCC): Status: ACTIVE | Noted: 2020-11-10

## 2022-03-18 PROBLEM — J96.02 ACUTE RESPIRATORY FAILURE WITH HYPOXIA AND HYPERCAPNIA (HCC): Status: ACTIVE | Noted: 2020-11-02

## 2022-03-18 PROBLEM — J96.01 ACUTE RESPIRATORY FAILURE WITH HYPOXIA AND HYPERCAPNIA (HCC): Status: ACTIVE | Noted: 2020-11-02

## 2022-03-18 PROBLEM — G47.33 OSA ON CPAP: Status: ACTIVE | Noted: 2020-11-10

## 2022-03-18 PROBLEM — Z99.89 OSA ON CPAP: Status: ACTIVE | Noted: 2020-11-10

## 2022-03-18 PROBLEM — Z79.4 TYPE 2 DIABETES MELLITUS WITH HYPERGLYCEMIA, WITH LONG-TERM CURRENT USE OF INSULIN (HCC): Status: ACTIVE | Noted: 2020-11-10

## 2022-03-19 PROBLEM — R59.0 MEDIASTINAL ADENOPATHY: Status: ACTIVE | Noted: 2020-11-03

## 2022-03-19 PROBLEM — E66.9 RESTRICTIVE LUNG DISEASE SECONDARY TO OBESITY: Status: ACTIVE | Noted: 2020-12-16

## 2022-03-19 PROBLEM — J98.4 RESTRICTIVE LUNG DISEASE SECONDARY TO OBESITY: Status: ACTIVE | Noted: 2020-12-16

## 2022-03-19 PROBLEM — E66.01 MORBID OBESITY (HCC): Status: ACTIVE | Noted: 2020-11-03

## 2022-03-20 PROBLEM — F17.200 TOBACCO DEPENDENCE: Status: ACTIVE | Noted: 2020-11-03

## 2022-03-20 PROBLEM — D75.1 POLYCYTHEMIA: Status: ACTIVE | Noted: 2020-11-02

## 2022-03-20 PROBLEM — E66.2 HYPOVENTILATION ASSOCIATED WITH OBESITY SYNDROME (HCC): Status: ACTIVE | Noted: 2020-11-02

## 2022-10-14 ENCOUNTER — TELEPHONE (OUTPATIENT)
Dept: PULMONOLOGY | Age: 62
End: 2022-10-14

## 2022-10-21 NOTE — TELEPHONE ENCOUNTER
Discussed at last appointment that if he loast significant weight he may be able to but would want to have him repeat sleep study first and then if not severe could come off but repeat abg after 1 week off. If he is interested can set him up for repeat PSG.      Clemente Kraus MD

## 2022-10-24 NOTE — TELEPHONE ENCOUNTER
Spoke with the patient and explained per Dr. Romaine South that if he lost significant weight he may be able to but would want to have him get a repeat PSG first and if that was not severe then we will need to do a repeat ABG after 1 week off. Patient stated that he has an appointment with Dr. Romaine South this Wednesday and would like to discuss this further with him. Patient did not have any further questions or concerns at this time.  // Nicol Gallagher

## 2022-10-26 ENCOUNTER — OFFICE VISIT (OUTPATIENT)
Dept: PULMONOLOGY | Age: 62
End: 2022-10-26
Payer: COMMERCIAL

## 2022-10-26 VITALS
WEIGHT: 274 LBS | BODY MASS INDEX: 43 KG/M2 | TEMPERATURE: 97.1 F | DIASTOLIC BLOOD PRESSURE: 90 MMHG | SYSTOLIC BLOOD PRESSURE: 144 MMHG | OXYGEN SATURATION: 94 % | HEIGHT: 67 IN | HEART RATE: 58 BPM

## 2022-10-26 DIAGNOSIS — E66.2 OBESITY HYPOVENTILATION SYNDROME (HCC): ICD-10-CM

## 2022-10-26 DIAGNOSIS — L98.9 SKIN LESION: ICD-10-CM

## 2022-10-26 DIAGNOSIS — R59.0 MEDIASTINAL LYMPHADENOPATHY: Primary | ICD-10-CM

## 2022-10-26 DIAGNOSIS — G47.33 OSA (OBSTRUCTIVE SLEEP APNEA): ICD-10-CM

## 2022-10-26 PROCEDURE — 99214 OFFICE O/P EST MOD 30 MIN: CPT | Performed by: INTERNAL MEDICINE

## 2022-10-26 NOTE — LETTER
Order Requisition for Cindi Moses  CSN: [id number]   Order Date:  Oct 26, 2022             Patient Information: Cindi Moses       :  1960  Age:  58 y.o. Sex:  M  Home Phone: [phone]  Work Phone:   SSN: [id number]  Address:  [address] [place] [address] [place] [address] MRN:  [id number]  Facility MRN:  [id number]  PCP: Fab Foster MD  PCP Phone: None           Ordering Dept: [facility]     Site: [address] [facility]  Ph: [phone] Fax: [phone]  Address: [address] [place] [address] Ordering User: Frantz Gill MD  Provider ID: [id number]  NPI:  [id number]               Test Ordered: NEELAM Suarez MD, Dermatology, [facility]   Code: MTB211   ORD #: [id number]  Associated Diagnosis: Skin lesion (L98.9 [ICD-10-CM])  Comments: The patient can be scheduled with any member of the group, including the provider with the first available appointments.    Scheduling Instructions: [facility]  [address] [place] [address], [facility] [address] [place] [address]  [phone]  Reason For External Referral? [facility] ([facility])  My clinical question is: Lesions on B shins Priority  Routine Class  External Referral      Order Status    Expected Date    Specimen Source    Collection Date    Collection Time    Occurrences Remaining    Interval         Electronically Signed By  Frantz Gill MD  NPI:  [id number] Date  Oct 26, 2022  8:21 AM              Responsible Party [employer] Information     Guar-ActID   Relationship Account Type Home Phone   [place] [address] Self P/F [phone]   Employer   Work Phone   DETAILS UNKNOWN                  Insurance & Policy Diaz Information     Primary Insurance  Insurance/Subscriber ID:  [id number]  Subscriber Name:  Leigh Ann GALARZA              Relationship to Patient: SpouseSigned ABN: N    Payor Name:  Jaciel [place]abb [place]   Group: [id number]  Worker's Comp Date of Injury:                  Progress Notes by Theodora Peter MD at 12/16/20 1400              Author: Theodora Peter MD  Service: --  Author Type: Physician      Filed: 12/16/20 1459  Encounter Date: 12/16/2020  Status: Signed         : Theodora Peter MD (Physician)               Princess Gemini Landers, Earnesteen [place]. [address] [place] [address], [address] [place] [address]   [phone]      Patient Name:  Cassie Rasmussen   YOB: 1960      Office Visit 12/16/2020      CHIEF COMPLAINT:      Chief Complaint       Patient presents with        [facility] Follow Up              HISTORY OF PRESENT ILLNESS:     I had the pleasure of seeing Mr. Cassie Rasmussen in our clinic today. Mr. Winifred Harada is a 61 y.o.  male who presents with a history of morbid obesity and weight gain here for hospital follow up. He was admitted from 11/2 to 11/5. Was noted to be short of breath and found to be hypercarbic in the 90's. He was admitted to the ICU and started on bipap. This improved his CO2 to the 50's and he was set up to use home trilogy as well as O2, 3L with  rest and 4L with exertion. During that admission he had spirometry suggesting a restrictive pattern and a CT that did not show any pulmonary fibrosis but did show an enlarged L mediastinal lymph node. He states that since coming home he has been feeling well. He states that he is feeling much better using the Trilogy machine. Using it every night, all night. Has more energy now. Back to working. Has not been using O2 during the day and states he does not plan to. He is working on losing weight. Was discharged with inhalers but does not feel they benefit him and has stopped using them. Denies wheeze, cough, chest tightness.             Past Medical History:        Diagnosis  Date           Hypercapnia               Problem List   Date Reviewed:  11/10/2020                        Codes  Class  Noted            Restrictive lung disease secondary to obesity  ICD-10-CM: J98.4, E66.9   ICD-9-CM: 518.89, 278.00    12/16/2020                       FLEX on CPAP  ICD-10-CM: G47.33, Z99.89   ICD-9-CM: 327.23, V46.8    11/10/2020                       Type 2 diabetes mellitus with hyperglycemia, with long-term current use of insulin (HCC)  ICD-10-CM: E11.65, Z79.4   ICD-9-CM: 250.00, 790.29, V58.67    11/10/2020                       Mediastinal adenopathy (Chronic)  ICD-10-CM: R59.0   ICD-9-CM: 785.6    11/3/2020                       Morbid obesity ([address]  ICD-10-CM: E66.01   ICD-9-CM: 278.01    11/3/2020                       Tobacco dependence (Chronic)  ICD-10-CM: R90.552   ICD-9-CM: 305.1    11/3/2020                       Hypoventilation associated with obesity syndrome (HCC) (Chronic)  ICD-10-CM: I60.8   ICD-9-CM: 278.03    11/2/2020                       Acute respiratory failure with hypoxia and hypercapnia (HCC)  ICD-10-CM: J96.01, J96.02   ICD-9-CM: 518.81    11/2/2020                       Polycythemia (Chronic)  ICD-10-CM: D75.1   ICD-9-CM: 238.4    11/2/2020                          No past surgical history on file. No flowsheet data found.           Social History         Socioeconomic History           Marital status:                Spouse name:  Not on file           Number of children:  Not on file       Years of education:  Not on file       Highest education level:  Not on file       Occupational History          Not on file       Social Needs           Financial resource strain:  Not on file          Food insecurity              Worry:  Not on file         Inability:  Not on file          Transportation needs              Medical:  Not on file         Non-medical:  Not on file       Tobacco Use           Smoking status:  Former Smoker              Packs/day:  0.50         Years:  10.00         Pack years: 5.00         Types:  Cigars, Cigarettes         Quit date:  18         Years since quittin.9           Smokeless tobacco:  Never Used       Substance and Sexual Activity           Alcohol use:  Not Currently       Drug use:  Not Currently       Sexual activity:  Yes              Partners:  Female         Birth control/protection:  None       Lifestyle          Physical activity              Days per week:  Not on file         Minutes per session:  Not on file           Stress:  Not on file       Relationships          Social connections              Talks on phone:  Not on file         Gets together:  Not on file         Attends Buddhist service:  Not on file         Active member of club or organization:  Not on file         Attends meetings of clubs or organizations:  Not on file         Relationship status:  Not on file          Intimate partner violence              Fear of current or ex partner:  Not on file         Emotionally abused:  Not on file         Physically abused:  Not on file         Forced sexual activity:  Not on file        Other Topics  Concern          Not on file       Social History Narrative          Not on file               Family History         Problem  Relation  Age of Onset            Hypertension  Mother         No Known Problems  Father         No Known Problems  Maternal Grandmother         No Known Problems  Maternal Grandfather         No Known Problems  Paternal Grandmother              No Known Problems  Paternal Grandfather             No Known Allergies       Current Outpatient Medications        Medication  Sig           OTHER  Trilogy-- 4 lpm cont       metFORMIN (GLUCOPHAGE) 500 mg tablet  Take 1 Tab by mouth two (2) times daily (with meals).   insulin glargine (LANTUS,BASAGLAR) 100 unit/mL (3 mL) inpn  20 Units by SubCUTAneous route daily.        Nebulizer & Compressor machine  To use twice daily           insulin lispro (HUMALOG) 100 unit/mL kwikpen  Less than 150 =   0 units            150 -199 =   2 units   200 -249 =   4 units   250 -299 =   6 units   300 -349 =   8 units   Before meals and at night         No current facility-administered medications for this visit. REVIEW OF SYSTEMS:      Review of Systems    Constitutional: Negative for chills, diaphoresis, fever, malaise/fatigue and weight loss. HENT: Negative for congestion, ear discharge, ear pain, hearing loss, nosebleeds, sinus pain, sore throat and tinnitus. Eyes: Negative for blurred vision, pain and redness. Respiratory: Negative for cough, hemoptysis, sputum production, shortness of breath and wheezing. Cardiovascular: Negative for chest pain, palpitations, orthopnea, leg swelling and PND. Gastrointestinal: Negative for abdominal pain, blood in stool, constipation, diarrhea, heartburn, melena, nausea and vomiting. Genitourinary: Negative for dysuria, frequency, hematuria and urgency. Musculoskeletal: Negative for back pain, joint pain, myalgias and neck pain. Skin: Negative for itching and rash. Neurological: Negative for dizziness, tremors, focal weakness, seizures and headaches. Endo/Heme/Allergies: Negative for environmental allergies. Does not bruise/bleed easily. Psychiatric/Behavioral: Negative for depression, hallucinations, memory loss and suicidal ideas. The patient is not nervous/anxious. PHYSICAL EXAM:   Visit Vitals     BP  120/80     Pulse  79     Temp  98 °F (36.7 °C) (Temporal)     Resp  20     Ht  5' 7\" (1.702 m)     Wt  276 lb (125.2 kg)     SpO2  94%        BMI  43.23 kg/m²           GENERAL APPEARANCE:   The patient is normal weight and in no respiratory distress. HEENT:   PERRL. Conjunctivae unremarkable. NECK/LYMPHATIC:   Symmetrical with no elevation of jugular venous pulsation. Trachea midline. No thyroid enlargement. No cervical adenopathy.    LUNGS:   Normal respiratory effort with symmetrical lung expansion. Breath sounds are clear bilaterally. HEART:   There is a regular rate and rhythm. No murmur, rub, or gallop. ABDOMEN:   Soft and non-tender. No hepatosplenomegaly. Bowel sounds are normal.     NEURO/PSYCH:   The patient is alert and oriented to person, place, and time. Memory appears intact and mood is normal.  No gross sensorimotor deficits are present. MS/SKIN:   There is no edema in the lower extremities. No rashes, bruises, lesions, ulcers visible. DIAGNOSTIC TESTS:   CT of chest:           Results from Hospital Encounter encounter on 11/02/20     CT CHEST W CONT           Impression  IMPRESSION:      1. Enlarged AP window lymph node. 2. Pleural thickening along the anterolateral basilar right lung base with   adjacent nodular atelectasis. 3. No acute pulmonary embolic disease. CPT code(s) T2993018                       No results found for this or any previous visit. Results for orders placed during the hospital encounter of 11/02/20     CT CHEST W CONT           Narrative  Clinical History: The Male patient is 61years old  presenting with symptoms of   hypoxia. Technique: Thin section axial images were obtained through the chest with intravenous   contrast.  Coronal reformatted images were also provided for review. A total of 100 ml of Iopamidol (ISOVUE-370) 76 % contrast was administered   intravenously. All CT scans at this facility are performed using dose reduction/dose modulation   techniques, as appropriate the performed exam, including the following:    Automated Exposure Control; Adjustment of the mA and/or kV according to patient   size (this includes techniques or standardized protocols for targeted exams   where dose is matched to indication/reason for exam); and Use of Iterative   Reconstruction Technique. Radiation Exposure Indices:   Reference Air Kerma (Jodell Minium) = 729 mGy-cm      Comparison:  Chest x-ray 11/2/2020.       FINDINGS: Images through the lungs demonstrate no evidence of pulmonary nodule or mass. No   effusions are identified. There is pleural thickening along the right   anterolateral basilar lung margin and mild adjacent nodular atelectasis      Normal vascular enhancement is demonstrated. No evidence of aortic dissection or   aneurysmal dilatation is seen. Enlarged left AP window lymph node measuring 2.4 cm Right hilar or mediastinal   calcification suggests previous granulomatous disease. Images chest wall structures as well as visualized portions of the upper abdomen   are unremarkable in appearance. There are no acute osseous abnormalities. Impression  IMPRESSION:      1. Enlarged AP window lymph node. 2. Pleural thickening along the anterolateral basilar right lung base with   adjacent nodular atelectasis. 3. No acute pulmonary embolic disease. CPT code(s) N5197207                    No results found for this or any previous visit. CXR:     No results found for this or any previous visit. PET/CT:    No results found for this or any previous visit. No results found for this or any previous visit. Exercise oximetry:     12/16/20   Resting RA Sat 91%   Lowest ambulating RA sat 86%   Lowest ambulating sat on 2L - 94      Spirometry:           Date:                   FVC                   FEV1                   FEV1/FVC                   FEF 25-75%                   Bronchodilator Response                   TLC                   RV                   DLCO                      Echo:   No results found for this or any previous visit. ASSESSMENT:  (Medical Decision Making)            ICD-10-CM  ICD-9-CM             1.  Adenopathy   R59.1  785.6  CT CHEST WO CONT                METABOLIC PANEL, BASIC          Lymph           2. FLEX on CPAP   G47.33  327.23  SLEEP MEDICINE REFERRAL            Z99.89  V46.8             3.  Mediastinal adenopathy   R59.0  785.6       4. Morbid obesity ([facility] [address]   E66.01  278.01       5. Restrictive lung disease secondary to obesity   J98.4  518.89              E66.9  278.00              Patient with obesity leading to FLEX and restriction. No signs of other underlying lung disease. Sleep study performed showing need for 15cm pressure. Already using Trilogy with 18/10 from hospital discharge. Needs 2L o2 with exertion but he states he is not likely to wear this. Lymph node needs repeat CT at 3 months. PLAN:   -repeat CT in February to monitor LAD. -refer to sleep office to cont to manage FLEX. For now cont Trilogy and check BMP today to see if bicarb is coming down as expected. If not may need abg.    -ongoing efforts at weight loss encouraged.    -encouraged to use 2L O2 with exertion. F/u in 3 months. Portions of this note were created using voice recognition software. As such, error of speech recognition may have occurred.         Orders Placed This Encounter          CT CHEST WO CONT              Standing Status:    Future         Standing Expiration Date:    1/16/2022         Order Specific Question:    Reason for Exam         Answer:    See Diagnosis          METABOLIC PANEL, BASIC              Standing Status:    Future         Standing Expiration Date:    6/14/2021        Lionel Owen  SLEEP MEDICINE REFERRAL              Referral Priority:    Routine         Referral Type:    Consultation         Referral Reason:    Specialty Services Required         Requested Specialty:    Sleep Medicine              Number of Visits Requested:    1           Eligio Mcallister MD   Electronically signed

## 2022-10-26 NOTE — PROGRESS NOTES
Patient Name:  Steve Andrews                             YOB: 1960  MRN: [id number]                                              Office Visit 10/26/2022    ASSESSMENT AND PLAN:  (Medical Decision Making)    Pt with obesity, likely OHS resulting in hypercarbia. Has been on Trilogy nearly 2 years. Interested in change to CPAP if possible. Hoping this will be possible due to recent weight loss, increase in exercise. Previously on exertional o2.     -sleep study to see if CPAP can be adequate.   -check abg a month after changing.   -walking sat check today showed no ongoing need for O2.   -repeat ct for mediastinal LAD. -refer to dermatology to eval the lesions on his anterior shins. F/u in 3 months. There are no diagnoses linked to this encounter. No orders of the defined types were placed in this encounter. No orders of the defined types were placed in this encounter. Zunilda Rios MD    Total time for encounter on day of encounter was 20 minutes. This time includes chart prep, review of tests/procedures, review of other provider's notes, documentation and counseling patient regarding disease process and medications. _________________________________________________________________________    HISTORY OF PRESENT ILLNESS:    Mr. Maryan Raman is a 58 y.o. male who is seen at [facility] today for  Follow-up   He has a history of morbid obesity, hypercarbia during hospitalization in 2020 of 90. Started on bipap at that time as well as 3L O2 at rest and 4L with exertion. Covid + Jan 2022 with fever, cough, myalgias. Seen in virtual visit at that time. He states since that time he feels like his breathing has been doing well. No SOB, cough, wheeze. His insurance company requires annual re-evaluation of trilogy. Not having to use his O2 since he got out of the hospital. Only using it at night.    He walks 6 days a week, able to climb [place] without struggling. Continues to use [employer] but having to rent it, costing him $200 a month. B shins with lesions that have been there for 3 years. Has never seen dermatology. REVIEW OF SYSTEMS: 10 point review of systems is negative except as reported in HPI. PHYSICAL EXAM: Body mass index is 42.91 kg/m². Vitals:    10/26/22 0801   BP: (!) 144/90   Pulse: 58   Temp: 97.1 °F (36.2 °C)   TempSrc: Skin   SpO2: 94%   Weight: 274 lb (124.3 kg)   Height: 5' 7\" (1.702 m)         General:   Alert, cooperative, no distress, appears stated age. Eyes:   Conjunctivae/corneas clear. PERRL        Mouth/Throat:  Lips, mucosa, and tongue normal. Teeth and gums normal.        Lungs:     CTA B, no w/r/r     Heart:   Regular rate and rhythm, S1, S2 normal, no murmur, click, rub or gallop. Abdomen:    Soft, non-tender. Extremities:  Extremities normal, atraumatic, no cyanosis or edema. Skin:  Skin color normal. B shins with hypopigmented areas surrounded by salmon colored edges. Neurologic:  A&Ox3     DIAGNOSTIC TESTS:                                                                                    LABS:   Lab Results   Component Value Date/Time    WBC 7.7 11/03/2020 03:14 AM    HGB 16.3 11/03/2020 03:14 AM    HCT 54.3 11/03/2020 03:14 AM     11/03/2020 03:14 AM    TSH 2.370 11/02/2020 10:35 AM     Imaging: I performed an independent interpretation of the patient's images. CXR:   XR CHEST PORTABLE 11/02/2020    Narrative  Clinical History: The Male patient is 61years old  presenting with symptoms of  shob/hypoxia. Comparison:  none    Findings:  Frontal view of the chest was obtained. The lung fields are clear. No pleural effusions are demonstrated. The  cardiomediastinal silhouette is upper limits of normal in size. There are no  acute osseous abnormalities. Impression  Impression:    1. Borderline cardiac enlargement.     CPT code(s) 61856    CT Chest:   CT CHEST W or any previous visit. No results found for this or any previous visit. FeNO: No results found for this or any previous visit. FeNO and Likelihood of Eosinophilic Asthma   Unlikely Intermediate Likely   <25 ppb 25-50 ppb >50ppb   Exercise Oximetry:  Resting RA Sat - 97  Lowest amb RA Sat - 93      Echo: No results found for this or any previous visit from the past 3650 days.     [facility] Reference Info:                                                                                                                  Past Medical History:   Diagnosis Date    Hypercapnia         Tobacco Use      Smoking status: Former        Packs/day: 0.50        Types: Cigarettes        Quit date: 1982        Years since quittin.8      Smokeless tobacco: Never    No Known Allergies  Current Outpatient Medications   Medication Instructions    insulin glargine (LANTUS;BASAGLAR) 20 Units, DAILY    insulin lispro, 1 Unit Dial, 100 UNIT/ML SOPN Less than 150 = 0 units 150 -199 = 2 blvrp699 -249 = 4 maknp249 -299 = 6 rgiji330 -349 = 8 unitsBefore meals and at night    metFORMIN (GLUCOPHAGE) 500 mg, 2 TIMES DAILY WITH MEALS

## 2022-11-03 ENCOUNTER — HOSPITAL ENCOUNTER (OUTPATIENT)
Dept: SLEEP CENTER | Age: 62
Discharge: HOME | End: 2022-11-06
Payer: COMMERCIAL

## 2022-11-03 PROCEDURE — 95811 POLYSOM 6/>YRS CPAP 4/> PARM: CPT

## 2022-11-07 ENCOUNTER — TELEPHONE (OUTPATIENT)
Dept: SLEEP MEDICINE | Age: 62
End: 2022-11-07

## 2022-11-07 DIAGNOSIS — G47.33 OSA (OBSTRUCTIVE SLEEP APNEA): Primary | ICD-10-CM

## 2022-11-07 NOTE — TELEPHONE ENCOUNTER
Patient is aware for his sleep study results. Per Dr. Yuval Trinh can discontinue Trilogy and oxygen at night and start cpap therapy. Would like patient to do an ABG in a few weeks to make sure he is not hypercapnic. Patient has agreed to this . Order will be sent to [facility] to d/c trilogy and oxygen. Cpap will be ordered with [facility].

## 2023-02-28 PROBLEM — Z76.89 ENCOUNTER TO ESTABLISH CARE WITH NEW DOCTOR: Status: ACTIVE | Noted: 2023-02-28

## 2023-02-28 RX ORDER — NITROGLYCERIN 0.4 MG/1
0.4 TABLET SUBLINGUAL EVERY 5 MIN PRN
COMMUNITY
Start: 2022-04-29 | End: 2023-03-01 | Stop reason: ALTCHOICE

## 2023-02-28 RX ORDER — ASPIRIN 81 MG/1
81 TABLET, CHEWABLE ORAL DAILY
COMMUNITY
Start: 2022-04-29 | End: 2023-03-01 | Stop reason: ALTCHOICE

## 2023-02-28 RX ORDER — SOFT LENS DISINFECTANT
SOLUTION, NON-ORAL MISCELLANEOUS
COMMUNITY
Start: 2020-11-05 | End: 2023-03-01 | Stop reason: ALTCHOICE

## 2023-02-28 ASSESSMENT — ENCOUNTER SYMPTOMS
NAUSEA: 0
SHORTNESS OF BREATH: 0
COUGH: 0
DIARRHEA: 0
VOMITING: 0
ABDOMINAL PAIN: 0

## 2023-02-28 NOTE — PROGRESS NOTES
[address], DO  [address] [facility], [place] [address], [address] [facility] [address]  [phone]          ASSESSMENT AND PLAN    Problem List Items Addressed This Visit          Respiratory    FLEX on CPAP      Controlled on CPAP, patient sleeps well and feels good. We will continue to follow            Endocrine    Type 2 diabetes mellitus with hyperglycemia, with long-term current use of insulin ([facility]     Patient with previous diagnosis of diabetes in 2020, A1C at the time was 10.7. Patient was discharged on medication but stopped taking it. States that he has been working hard on diet and exercise to lose weight because he does not like to take medicine. Does not want to check labs today. Notes that he plans to lose another 70 pounds this year. Agrees to return in 3 months for fasting labs so we can see how his diabetes is doing. Other    Encounter to establish care with new doctor - Primary    Morbid obesity ([facility]     Patient with current BMI of 43, plans to lose 70 pounds in the next 9 months. Notes that he has been walking and watching his diet. Plans to start a new heart healthy diet in 2 weeks with his wife. Congratulated on weight loss up until now and discussed lengthening his walking distance and adding in some light weights. Discussed healthy diet with patient, who hopes to lose 20 pounds in the next 3 months. We will have him return then for fasting labs and weight check. Other Visit Diagnoses       Screening for colon cancer        Relevant Orders    [address] [place] [facility], [facility]             The diagnoses and plan were discussed with the patient, who verbalizes understanding and agrees with plan. All questions answered.     Chief Complaint    Chief Complaint   Patient presents with    Establish Care    Diabetes    Other     Wants a colonoscopy        HISTORY OF PRESENT ILLNESS    58 y.o. male presents today to establish care with a new primary care provider. Was admitted to the hospital in October-2021 for hypoxemia, started on CPAP while in the hospital.  Notes that he was discharged home on oxygen and [employer]. States that he was only using oxygen at night. While hospitalized he was told that he had type II diabetes and was discharged on medication but is not taking any medicine. Just recently did another sleep study and states that he was recently switched to a CPAP machine. Has been walking every morning up and down hills and states that he feels great now. Does not check his blood sugars and has not had it checked in awhile. Notes that his goal is to lose 70 pounds this year. Does not want to recheck labs at this time. Notes that he needs a referral for colonoscopy. States that one of his friends at [facility] was just diagnosed with colon cancer. Denies blood in his stool or changes in stool habits. Denies family history of colon cancer. States that he is planning to start a heart healthy diet in 2 weeks with his wife. PAST MEDICAL HISTORY    Past Medical History:   Diagnosis Date    Diabetes mellitus ([facility]     Hypercapnia        PAST SURGICAL HISTORY    History reviewed. No pertinent surgical history.     FAMILY HISTORY    Family History   Problem Relation Age of Onset    Hypertension Mother     No Known Problems Father     No Known Problems Maternal Grandmother     No Known Problems Maternal Grandfather     No Known Problems Paternal Grandmother     No Known Problems Paternal Grandfather        SOCIAL HISTORY    Social History     Socioeconomic History    Marital status:      Spouse name: None    Number of children: None    Years of education: None    Highest education level: None   Tobacco Use    Smoking status: Former     Packs/day: 0.50     Types: Cigarettes     Quit date: 1982     Years since quittin.1    Smokeless tobacco: Never   Substance and Sexual Activity    Alcohol use: Not Currently     Comment: occ    Drug use: Never    Sexual activity: Yes     Birth control/protection: None     Social Determinants of Health     Financial Resource Strain: Low Risk     Difficulty of Paying Living Expenses: Not hard at all   Food Insecurity: No Food Insecurity    Worried About Running Out of Food in the Last Year: Never true    Ran Out of Food in the Last Year: Never true   Transportation Needs: Unknown    Lack of Transportation (Non-Medical): No   Housing Stability: Unknown    Unstable Housing in the Last Year: No       MEDICATIONS    No current outpatient medications on file. ALLERGIES / INTOLERANCES    No Known Allergies    REVIEW OF SYSTEMS    Review of Systems   Constitutional:  Negative for fever. HENT:  Negative for congestion. Respiratory:  Negative for cough and shortness of breath. Cardiovascular:  Negative for chest pain. Gastrointestinal:  Negative for abdominal pain, diarrhea, nausea and vomiting. Psychiatric/Behavioral:  Negative for dysphoric mood. PHYSICAL EXAMINATION    Vitals:    03/01/23 1416   BP: 132/82   Pulse: 89   Resp: 16   SpO2: 98%       Physical Exam  Vitals and nursing note reviewed. Constitutional:       Appearance: Normal appearance. He is obese. HENT:      Head: Normocephalic and atraumatic. Right Ear: External ear normal.      Left Ear: External ear normal.      Nose: Nose normal.      Mouth/Throat:      Mouth: Mucous membranes are moist.   Eyes:      Extraocular Movements: Extraocular movements intact. Cardiovascular:      Rate and Rhythm: Normal rate and regular rhythm. Heart sounds: Normal heart sounds. No murmur heard. Pulmonary:      Effort: Pulmonary effort is normal. No respiratory distress. Breath sounds: Normal breath sounds. Abdominal:      General: Bowel sounds are normal.      Palpations: Abdomen is soft. Tenderness: There is no abdominal tenderness. There is no right CVA tenderness or left CVA tenderness. Musculoskeletal:         General: Normal range of motion. Cervical back: Normal range of motion. Skin:     General: Skin is warm. Neurological:      General: No focal deficit present. Mental Status: He is alert. Psychiatric:         Mood and Affect: Mood normal.         Behavior: Behavior normal.         PERTINENT LABS AND IMAGING    Component Ref Range & Units 11/2/20 1035    Hemoglobin A1C 4.8 - 6.0 % 10.7 High       WBC 3.5 - 10.8 K/uL 7.8    RBC 4.17 - 5.89 M/uL 4.83    Hemoglobin 12.7 - 17.2 g/dL 15.0    Hematocrit 39.4 - 51.6 % 43.7    MCV 79.0 - 100.0 fL 90.5    MCH 25.7 - 33.2 pg 31.1    MCHC 30.0 - 36.5 g/dL 34.3    RDW-CV 11.6 - 16.0 % 12.4    Platelets 524 - 829 K/uL 172    MPV 9.2 - 12.8 fL 13.3 High     Neutrophils, % % 70.3    Lymphocytes, % % 21.0    Monocytes, % % 5.2    Eosinophils % % 2.2    Basophils % % 0.8    Neutrophils, Abs 1.78 - 5.38 K/uL 5.50 High     Lymphocytes, Abs 1.32 - 3.57 K/uL 1.70    Monocytes, Abs 0.30 - 0.82 K/uL 0.40    Eosinophils, Abs 0.04 - 0.54 K/uL 0.20    Basophils, Abs 0.01 - 0.08 K/uL 0.10 High     Immature Granulocytes, % % 0.5    Immature Granulocytes, Abs 0.00 - 0.03 K/uL 0.00    nRBC Percent 0 % 0    nRBC Abs 0.0 - 0.2 K/uL 0.0      Sodium 136 - 145 mmol/L 133 Low     Potassium 3.5 - 5.1 mmol/L 5.0    Comment: Moderate hemolysis. Results may be affected.    Chloride 98 - 107 mmol/L 99    CO2 20 - 30 mmol/L 24    BUN 8 - 26 mg/dL 14    Calcium 8.5 - 10.4 mg/dL 9.4    Creatinine 0.72 - 1.25 mg/dL 1.03    Glucose 70 - 99 mg/dL 267 High     Anion Gap 6 - 16 mmol/L 10    eGFR >59 mL/min/1.73 m2 [address], DO  3:05 PM  03/01/23

## 2023-03-01 ENCOUNTER — OFFICE VISIT (OUTPATIENT)
Dept: PRIMARY CARE CLINIC | Facility: CLINIC | Age: 63
End: 2023-03-01
Payer: COMMERCIAL

## 2023-03-01 VITALS
RESPIRATION RATE: 16 BRPM | DIASTOLIC BLOOD PRESSURE: 82 MMHG | OXYGEN SATURATION: 98 % | HEIGHT: 67 IN | HEART RATE: 89 BPM | SYSTOLIC BLOOD PRESSURE: 132 MMHG | WEIGHT: 274.6 LBS | BODY MASS INDEX: 43.1 KG/M2

## 2023-03-01 DIAGNOSIS — G47.33 OSA ON CPAP: ICD-10-CM

## 2023-03-01 DIAGNOSIS — Z12.11 SCREENING FOR COLON CANCER: ICD-10-CM

## 2023-03-01 DIAGNOSIS — Z79.4 TYPE 2 DIABETES MELLITUS WITH HYPERGLYCEMIA, WITH LONG-TERM CURRENT USE OF INSULIN (HCC): ICD-10-CM

## 2023-03-01 DIAGNOSIS — Z99.89 OSA ON CPAP: ICD-10-CM

## 2023-03-01 DIAGNOSIS — Z76.89 ENCOUNTER TO ESTABLISH CARE WITH NEW DOCTOR: Primary | ICD-10-CM

## 2023-03-01 DIAGNOSIS — E66.01 MORBID OBESITY (HCC): ICD-10-CM

## 2023-03-01 DIAGNOSIS — E11.65 TYPE 2 DIABETES MELLITUS WITH HYPERGLYCEMIA, WITH LONG-TERM CURRENT USE OF INSULIN (HCC): ICD-10-CM

## 2023-03-01 PROCEDURE — 99214 OFFICE O/P EST MOD 30 MIN: CPT | Performed by: FAMILY MEDICINE

## 2023-03-01 SDOH — ECONOMIC STABILITY: INCOME INSECURITY: HOW HARD IS IT FOR YOU TO PAY FOR THE VERY BASICS LIKE FOOD, HOUSING, MEDICAL CARE, AND HEATING?: NOT HARD AT ALL

## 2023-03-01 SDOH — ECONOMIC STABILITY: FOOD INSECURITY: WITHIN THE PAST 12 MONTHS, YOU WORRIED THAT YOUR FOOD WOULD RUN OUT BEFORE YOU GOT MONEY TO BUY MORE.: NEVER TRUE

## 2023-03-01 SDOH — ECONOMIC STABILITY: FOOD INSECURITY: WITHIN THE PAST 12 MONTHS, THE FOOD YOU BOUGHT JUST DIDN'T LAST AND YOU DIDN'T HAVE MONEY TO GET MORE.: NEVER TRUE

## 2023-03-01 SDOH — ECONOMIC STABILITY: HOUSING INSECURITY
IN THE LAST 12 MONTHS, WAS THERE A TIME WHEN YOU DID NOT HAVE A STEADY PLACE TO SLEEP OR SLEPT IN A SHELTER (INCLUDING NOW)?: NO

## 2023-03-01 ASSESSMENT — PATIENT HEALTH QUESTIONNAIRE - PHQ9
SUM OF ALL RESPONSES TO PHQ QUESTIONS 1-9: 0
2. FEELING DOWN, DEPRESSED OR HOPELESS: 0
1. LITTLE INTEREST OR PLEASURE IN DOING THINGS: 0
SUM OF ALL RESPONSES TO PHQ QUESTIONS 1-9: 0
SUM OF ALL RESPONSES TO PHQ9 QUESTIONS 1 & 2: 0

## 2023-03-01 ASSESSMENT — ANXIETY QUESTIONNAIRES
4. TROUBLE RELAXING: 0
2. NOT BEING ABLE TO STOP OR CONTROL WORRYING: 0
6. BECOMING EASILY ANNOYED OR IRRITABLE: 0
7. FEELING AFRAID AS IF SOMETHING AWFUL MIGHT HAPPEN: 0
1. FEELING NERVOUS, ANXIOUS, OR ON EDGE: 0
GAD7 TOTAL SCORE: 0
3. WORRYING TOO MUCH ABOUT DIFFERENT THINGS: 0
IF YOU CHECKED OFF ANY PROBLEMS ON THIS QUESTIONNAIRE, HOW DIFFICULT HAVE THESE PROBLEMS MADE IT FOR YOU TO DO YOUR WORK, TAKE CARE OF THINGS AT HOME, OR GET ALONG WITH OTHER PEOPLE: NOT DIFFICULT AT ALL
5. BEING SO RESTLESS THAT IT IS HARD TO SIT STILL: 0

## 2023-03-01 NOTE — PATIENT INSTRUCTIONS
IT WAS GREAT TO SEE YOU TODAY! CONGRATULATIONS ON YOUR WEIGHT LOSS!  KEEP UP THE GOOD WORK! I HAVE REFERRED YOU TO SURGERY FOR YOUR COLONOSCOPY, THEY WILL CONTACT YOU WITH AN APPOINTMENT    PLEASE WORK ON DIET - EAT MORE LEAN PROTEINS (CHICKEN, FISH, BEANS, TURKEY), FRUITS, VEGETABLES AND DRINK MORE WATER. EAT LESS RED MEAT, DAIRY PRODUCTS, STARCHY FOODS (POTATOES, RICE, PASTA, BREAD, TORTILLAS, CHIPS, COOKIES, CAKES), SWEETS AND DRINK LESS SODA, LESS ENERGY DRINKS, LESS JUICE AND SWEET TEA. TRY TO EAT THREE MEALS A DAY WITH SOME SORT OF PROTEIN AND TRY TO CUT BACK ON SNACKS (UNLESS IT IS HEALTHY - VEGGIES AND HUMMUS, ONE SERVING OF NUTS, ONE SERVING OF FRUIT, ETC). PAY ATTENTION TO SERVING SIZES ON THE PACKAGES SO YOU DO NOT EAT LARGER PORTIONS. Please try to do some form of aerobic exercise at least 3-4 times per week for about 20-30 minutes at a time. Aerobic exercise can include walking, hiking, jogging, swimming or using an elliptical machine. You can also do light weights or consider doing free exercises on your smart TV.   [employer] has multiple free exercise videos that include yoga, kickboxing, pilates, aerobics, etc.    I WILL SEE YOU AGAIN IN 3 MONTHS BUT PLEASE CALL WITH CONCERNS [phone]

## 2023-03-01 NOTE — ASSESSMENT & PLAN NOTE
Patient with current BMI of 43, plans to lose 70 pounds in the next 9 months. Notes that he has been walking and watching his diet. Plans to start a new heart healthy diet in 2 weeks with his wife. Congratulated on weight loss up until now and discussed lengthening his walking distance and adding in some light weights. Discussed healthy diet with patient, who hopes to lose 20 pounds in the next 3 months. We will have him return then for fasting labs and weight check.

## 2023-03-01 NOTE — ASSESSMENT & PLAN NOTE
Patient with previous diagnosis of diabetes in 2020, A1C at the time was 10.7. Patient was discharged on medication but stopped taking it. States that he has been working hard on diet and exercise to lose weight because he does not like to take medicine. Does not want to check labs today. Notes that he plans to lose another 70 pounds this year. Agrees to return in 3 months for fasting labs so we can see how his diabetes is doing.

## 2023-03-14 ENCOUNTER — TELEPHONE (OUTPATIENT)
Dept: PRIMARY CARE CLINIC | Facility: CLINIC | Age: 63
End: 2023-03-14

## 2023-03-14 NOTE — TELEPHONE ENCOUNTER
Patient wife called to inquire about patient referral to be set up for colonoscopy, as they have not been contacted by scheduling. Gave contact information to wife by calling her back and leaving it on her voicemail as she could not write it down, she was driving.

## 2023-05-30 ENCOUNTER — TELEPHONE (OUTPATIENT)
Dept: PRIMARY CARE CLINIC | Facility: CLINIC | Age: 63
End: 2023-05-30

## 2023-05-30 DIAGNOSIS — H93.13 TINNITUS OF BOTH EARS: Primary | ICD-10-CM

## 2023-05-30 NOTE — TELEPHONE ENCOUNTER
Patient is experiencing ringing in his ears and would like to see an ENT, he called on his own and was told he would need a referral.

## 2023-06-06 ENCOUNTER — TELEPHONE (OUTPATIENT)
Dept: PRIMARY CARE CLINIC | Facility: CLINIC | Age: 63
End: 2023-06-06

## 2023-06-06 NOTE — TELEPHONE ENCOUNTER
Patient wife called stating that patient received a call from [place]  [facility] to set up visit. The patient declined stating he didn't need it per wife, and that it isn't coming from his ears. I explaned that it was essential to still see the ENT to confirm or deny that, so next steps can be taken, perhaps neurology, and that documentation from the visit would be used for that purpose. Patient wife understood and will call [place] [facility] to set up his visit. Patient wife states in the meantime he is still suffering from headaches.

## 2023-07-20 ENCOUNTER — COMMUNITY OUTREACH (OUTPATIENT)
Dept: PRIMARY CARE CLINIC | Facility: CLINIC | Age: 63
End: 2023-07-20

## 2023-07-20 ENCOUNTER — CLINICAL DOCUMENTATION (OUTPATIENT)
Dept: SURGERY | Age: 63
End: 2023-07-20

## 2023-07-20 ENCOUNTER — PREP FOR PROCEDURE (OUTPATIENT)
Dept: SURGERY | Age: 63
End: 2023-07-20

## 2023-07-20 DIAGNOSIS — Z12.11 COLON CANCER SCREENING: ICD-10-CM

## 2023-07-20 NOTE — PROGRESS NOTES
Pt has no family history of colon cancer  Pt is not taking anticoagulation  Pt has no previous colonoscopies discoverable through chart review    I have reviewed the patient's chart and consider the patient an acceptable risk for screening colonoscopy without a formal office visit. We will contact the patient to give the details of the bowel prep and to schedule screening colonoscopy in the near future. Once the colonoscopy has been completed, the Health Maintenance will be updated accordingly.      LIDIA Lopez - NP

## 2023-08-21 ENCOUNTER — PREP FOR PROCEDURE (OUTPATIENT)
Dept: SURGERY | Age: 63
End: 2023-08-21

## 2023-08-21 RX ORDER — SODIUM CHLORIDE 9 MG/ML
INJECTION, SOLUTION INTRAVENOUS PRN
Status: CANCELLED | OUTPATIENT
Start: 2023-08-21

## 2023-08-21 RX ORDER — SODIUM CHLORIDE 0.9 % (FLUSH) 0.9 %
5-40 SYRINGE (ML) INJECTION EVERY 12 HOURS SCHEDULED
Status: CANCELLED | OUTPATIENT
Start: 2023-08-21

## 2023-08-21 RX ORDER — SODIUM CHLORIDE 0.9 % (FLUSH) 0.9 %
5-40 SYRINGE (ML) INJECTION PRN
Status: CANCELLED | OUTPATIENT
Start: 2023-08-21

## 2023-09-14 RX ORDER — ACETAMINOPHEN 325 MG/1
650 TABLET ORAL EVERY 6 HOURS PRN
COMMUNITY

## 2023-09-14 NOTE — PERIOP NOTE
Dear  Manda Mcdonough,      Thank you for completing your phone assessment with me today. Here are your requested procedure instructions. Please call #[phone] with any questions/concerns. Your procedure is scheduled at [address] [place] [address]. Please arrive at MAIN Entrance. GI Lab (#[phone]) will call you on the business day before your surgery with your arrival time. If you have any questions on the day of procedure, please call the GI dept. at the telephone number above. Follow procedure instructions for EGD or colonoscopy prep received from the GI/Surgeon office. If you have not received instructions from GI office, please call their office to receive prep instructions for procedure. Please take these medications on the morning of the procedure with a small sip of water: NONE. Please stop all vitamins/supplements 7 days prior to the procedure and stop all NSAIDS (ibuprofen, naproxen, aleve, motrin, advil) 5 days before your procedure. A responsible adult must drive you to the hospital, remain in the building during the procedure and you will need adult supervision for 24 hours after anesthesia. Please use a non-moisturizing soap the night before the procedure and on the morning of the procedure. Do NOT wear: make-up, nail polish, lotions, cologne, perfumes, powders or oil on your skin. All piercings/metal/jewelry must be removed prior to arrival.  If you wear contacts then you will need to bring a case to store them in or wear your glasses. Deodorant is acceptable with all EGDs and/or colonoscopies. Medication given during procedure may cause drowsiness for several hours, therefore, do not drive or operate machinery for remainder of the day. You may not drink alcohol on the day of your procedure, please resume regular diet and activity unless otherwise directed. For colonoscopy:  You may experience abdominal distention

## 2023-09-14 NOTE — PERIOP NOTE
Patient verified name, , and procedure. Type: 1a; abbreviated assessment per anesthesia guidelines    Labs per anesthesia: None per protocol    Instructed pt that they will be notified the day before their procedure by the GI Lab for time of arrival if their procedure is [place] and Pre-op for [place] cases. Arrival times should be called by 4-5 pm. If no phone is received the patient should contact their respective hospital. The GI lab telephone number is [phone]. Follow diet and prep instructions per office including NPO status. If patient has NOT received instructions from office patient is advised to call surgeon office, verbalizes understanding. Bath or shower the night before and the am of surgery with non-moisturizing soap. No lotions, oils, powders, cologne on skin. No make up, eye make up or jewelry. Wear loose fitting comfortable, clean clothing. Must have adult present in building the entire time . Medications for the day of procedure: NONE, patient to hold: vitamins, NSAIDs    The following discharge instructions reviewed with patient: medication given during procedure may cause drowsiness for several hours, therefore, do not drive or operate machinery for remainder of the day. You may not drink alcohol on the day of your procedure, please resume regular diet and activity unless otherwise directed. You may experience abdominal distention for several hours that is relieved by the passage of gas. Contact your physician if you have any of the following: fever or chills, severe abdominal pain or excessive amount of bleeding or a large amount when having a bowel movement.  Occasional specks of blood with bowel movement would not be unusual.

## 2023-09-18 PROBLEM — Z12.11 COLON CANCER SCREENING: Status: ACTIVE | Noted: 2023-07-20

## 2023-09-19 ENCOUNTER — ANESTHESIA EVENT (OUTPATIENT)
Dept: ENDOSCOPY | Age: 63
End: 2023-09-19
Payer: COMMERCIAL

## 2023-09-19 NOTE — PROGRESS NOTES
Informed patient of 0900 arrival time for 1030 procedure. Informed patient that they must have a  with them the entire time that they are in the hospital. Patient verbalized understanding.

## 2023-09-20 ENCOUNTER — ANESTHESIA (OUTPATIENT)
Dept: ENDOSCOPY | Age: 63
End: 2023-09-20
Payer: COMMERCIAL

## 2023-09-20 ENCOUNTER — HOSPITAL ENCOUNTER (OUTPATIENT)
Age: 63
Setting detail: OUTPATIENT SURGERY
Discharge: HOME | End: 2023-09-20
Attending: SURGERY | Admitting: SURGERY
Payer: COMMERCIAL

## 2023-09-20 VITALS
HEIGHT: 67 IN | HEART RATE: 58 BPM | SYSTOLIC BLOOD PRESSURE: 116 MMHG | RESPIRATION RATE: 16 BRPM | OXYGEN SATURATION: 97 % | BODY MASS INDEX: 41.59 KG/M2 | TEMPERATURE: 97 F | DIASTOLIC BLOOD PRESSURE: 67 MMHG | WEIGHT: 265 LBS

## 2023-09-20 PROCEDURE — 6360000002 HC RX W HCPCS: Performed by: NURSE ANESTHETIST, CERTIFIED REGISTERED

## 2023-09-20 PROCEDURE — 7100000011 HC PHASE II RECOVERY - ADDTL 15 MIN: Performed by: SURGERY

## 2023-09-20 PROCEDURE — 2500000003 HC RX 250 WO HCPCS: Performed by: NURSE ANESTHETIST, CERTIFIED REGISTERED

## 2023-09-20 PROCEDURE — 2580000003 HC RX 258: Performed by: STUDENT IN AN ORGANIZED HEALTH CARE EDUCATION/TRAINING PROGRAM

## 2023-09-20 PROCEDURE — 2709999900 HC NON-CHARGEABLE SUPPLY: Performed by: SURGERY

## 2023-09-20 PROCEDURE — 45378 DIAGNOSTIC COLONOSCOPY: CPT | Performed by: SURGERY

## 2023-09-20 PROCEDURE — 3700000000 HC ANESTHESIA ATTENDED CARE: Performed by: SURGERY

## 2023-09-20 PROCEDURE — 3700000001 HC ADD 15 MINUTES (ANESTHESIA): Performed by: SURGERY

## 2023-09-20 PROCEDURE — 7100000010 HC PHASE II RECOVERY - FIRST 15 MIN: Performed by: SURGERY

## 2023-09-20 PROCEDURE — 3609027000 HC COLONOSCOPY: Performed by: SURGERY

## 2023-09-20 RX ORDER — EPHEDRINE SULFATE/0.9% NACL/PF 50 MG/5 ML
SYRINGE (ML) INTRAVENOUS PRN
Status: DISCONTINUED | OUTPATIENT
Start: 2023-09-20 | End: 2023-09-20 | Stop reason: SDUPTHER

## 2023-09-20 RX ORDER — SODIUM CHLORIDE 0.9 % (FLUSH) 0.9 %
5-40 SYRINGE (ML) INJECTION PRN
Status: DISCONTINUED | OUTPATIENT
Start: 2023-09-20 | End: 2023-09-20 | Stop reason: HOSPADM

## 2023-09-20 RX ORDER — LIDOCAINE HYDROCHLORIDE 10 MG/ML
1 INJECTION, SOLUTION INFILTRATION; PERINEURAL
Status: DISCONTINUED | OUTPATIENT
Start: 2023-09-20 | End: 2023-09-20 | Stop reason: HOSPADM

## 2023-09-20 RX ORDER — LIDOCAINE HYDROCHLORIDE 20 MG/ML
INJECTION, SOLUTION EPIDURAL; INFILTRATION; INTRACAUDAL; PERINEURAL PRN
Status: DISCONTINUED | OUTPATIENT
Start: 2023-09-20 | End: 2023-09-20 | Stop reason: SDUPTHER

## 2023-09-20 RX ORDER — SODIUM CHLORIDE 0.9 % (FLUSH) 0.9 %
5-40 SYRINGE (ML) INJECTION EVERY 12 HOURS SCHEDULED
Status: DISCONTINUED | OUTPATIENT
Start: 2023-09-20 | End: 2023-09-20 | Stop reason: HOSPADM

## 2023-09-20 RX ORDER — SODIUM CHLORIDE, SODIUM LACTATE, POTASSIUM CHLORIDE, CALCIUM CHLORIDE 600; 310; 30; 20 MG/100ML; MG/100ML; MG/100ML; MG/100ML
INJECTION, SOLUTION INTRAVENOUS CONTINUOUS
Status: DISCONTINUED | OUTPATIENT
Start: 2023-09-20 | End: 2023-09-20 | Stop reason: HOSPADM

## 2023-09-20 RX ORDER — SODIUM CHLORIDE 9 MG/ML
INJECTION, SOLUTION INTRAVENOUS PRN
Status: DISCONTINUED | OUTPATIENT
Start: 2023-09-20 | End: 2023-09-20 | Stop reason: HOSPADM

## 2023-09-20 RX ORDER — PROPOFOL 10 MG/ML
INJECTION, EMULSION INTRAVENOUS CONTINUOUS PRN
Status: DISCONTINUED | OUTPATIENT
Start: 2023-09-20 | End: 2023-09-20 | Stop reason: SDUPTHER

## 2023-09-20 RX ADMIN — Medication 10 MG: at 10:54

## 2023-09-20 RX ADMIN — SODIUM CHLORIDE, POTASSIUM CHLORIDE, SODIUM LACTATE AND CALCIUM CHLORIDE: 600; 310; 30; 20 INJECTION, SOLUTION INTRAVENOUS at 09:42

## 2023-09-20 RX ADMIN — PROPOFOL 300 MCG/KG/MIN: 10 INJECTION, EMULSION INTRAVENOUS at 10:38

## 2023-09-20 RX ADMIN — LIDOCAINE HYDROCHLORIDE 40 MG: 20 INJECTION, SOLUTION EPIDURAL; INFILTRATION; INTRACAUDAL; PERINEURAL at 10:34

## 2023-09-20 ASSESSMENT — PAIN - FUNCTIONAL ASSESSMENT: PAIN_FUNCTIONAL_ASSESSMENT: NONE - DENIES PAIN

## 2023-09-20 NOTE — ANESTHESIA POSTPROCEDURE EVALUATION
Department of Anesthesiology  Postprocedure Note    Patient: Michelle Salgado  MRN: [id number]  YOB: 1960  Date of evaluation: 9/20/2023      Procedure Summary     Date: 09/20/23 Room / Location: [facility] ENDO 04 / [facility]    Anesthesia Start: 1031 Anesthesia Stop: 1101    Procedure: COLORECTAL CANCER SCREENING, NOT HIGH RISK (Lower GI Region) Diagnosis:       Colon cancer screening      (Colon cancer screening [Z12.11])    Surgeons: William Shi MD Responsible Provider: Russ Kilgore MD    Anesthesia Type: TIVA ASA Status: 3          Anesthesia Type: No value filed.     Deep Phase I: Deep Score: 10    Deep Phase II: Deep Score: 10      Anesthesia Post Evaluation    Patient location during evaluation: bedside  Patient participation: complete - patient participated  Level of consciousness: awake and alert  Airway patency: patent  Nausea & Vomiting: no vomiting  Complications: no  Cardiovascular status: hemodynamically stable  Respiratory status: acceptable  Hydration status: euvolemic  Pain management: adequate

## 2023-09-20 NOTE — OP NOTE
Operative Note      Patient: Shanna Segal  YOB: 1960  MRN: [id number]    Date of Procedure: 9/20/2023    Pre-Op Diagnosis Codes:     * Colon cancer screening [Z12.11]    Post-Op Diagnosis:  Descending colon,sigmoid colon diverticulae       Procedure(s):  COLORECTAL CANCER SCREENING, NOT HIGH RISK    Surgeon(s):  Ramsey Raya MD    Assistant:   * No surgical staff found *    Anesthesia: Monitor Anesthesia Care    Estimated Blood Loss (mL): Minimal    Complications: None    Specimens:   * No specimens in log *    Implants:  * No implants in log *      Drains: * No LDAs found *    Findings: As above        Detailed Description of Procedure:   Dictated   [id number]    Electronically signed by Ramsey Mendoza MD on 9/20/2023 at 10:56 AM

## 2023-09-20 NOTE — BRIEF OP NOTE
Brief Postoperative Note      Patient: Marlee Vergara  YOB: 1960  MRN: [id number]    Date of Procedure: 9/20/2023    Pre-Op Diagnosis Codes:     * Colon cancer screening [Z12.11]    Post-Op Diagnosis:  Descending colon,sigmoid colon diverticulae       Procedure(s):  COLORECTAL CANCER SCREENING, NOT HIGH RISK    Surgeon(s):  Gay Shepard MD    Assistant:  * No surgical staff found *    Anesthesia: Monitor Anesthesia Care    Estimated Blood Loss (mL): Minimal    Complications: None    Specimens:   * No specimens in log *    Implants:  * No implants in log *      Drains: * No LDAs found *    Findings: As above.   Repeat 5 years      Electronically signed by Gay Tenorio MD on 9/20/2023 at 10:55 AM

## 2023-09-20 NOTE — PROGRESS NOTES
Discharge instructions were reviewed with patient and wife Rafael July. An opportunity was given for questions. Patient and wife verbalized understanding, and has no questions at this time.

## 2023-09-20 NOTE — H&P
[address]  [phone]     History and Physical/Surgical Consult   Bella Herman    Admit date: 2023    MRN: [id number]     : 1960     Age: 61 y.o.          2023 10:31 AM    Subjective/HPI:   This patient is a 61 y.o. here today for his first colonoscopy. No family history of colon cancer. No change in bowel habits. Review of Systems  Pertinent items are noted in HPI. Past Medical History:   Diagnosis Date    Diabetes mellitus ([address])     exercise and diet controlled    Hypercapnia     FLEX (obstructive sleep apnea)     cpap used every night      History reviewed. No pertinent surgical history. No Known Allergies   Social History     Tobacco Use    Smoking status: Former     Packs/day: .5     Types: Cigarettes     Quit date: 1982     Years since quittin.7    Smokeless tobacco: Never   Substance Use Topics    Alcohol use: Not Currently     Comment: occ      Social History     Social History Narrative    Not on file     Family History   Problem Relation Age of Onset    Hypertension Mother     No Known Problems Father     No Known Problems Maternal Grandmother     No Known Problems Maternal Grandfather     No Known Problems Paternal Grandmother     No Known Problems Paternal Grandfather       Prior to Admission Medications   Prescriptions Last Dose Informant Patient Reported?  Taking?   acetaminophen (TYLENOL) 325 MG tablet Past Month  Yes Yes   Sig: Take 2 tablets by mouth every 6 hours as needed for Pain      Facility-Administered Medications: None     Current Facility-Administered Medications   Medication Dose Route Frequency    lidocaine 1 % injection 1 mL  1 mL IntraDERmal Once PRN    lactated ringers IV soln infusion   IntraVENous Continuous    sodium chloride flush 0.9 % injection 5-40 mL  5-40 mL IntraVENous 2 times per day    sodium chloride flush 0.9 % injection 5-40 mL  5-40 mL IntraVENous PRN

## 2023-09-20 NOTE — OP NOTE
[address] [facility]  OPERATIVE REPORT    Name:  Matthew Ortiz  MR#:  [id number]  :  1960  ACCOUNT #:  [de-identified]  DATE OF SERVICE:  2023    PREOPERATIVE DIAGNOSIS:  Screening colonoscopy. POSTOPERATIVE DIAGNOSIS:  Descending colon and sigmoid colon diverticula. PROCEDURE PERFORMED:  Colonoscopy. SURGEON:  Jael Gray MD    ASSISTANT:  None. ANESTHESIA:  MAC.    COMPLICATIONS:  None. SPECIMENS REMOVED:  None. IMPLANTS:  None. ESTIMATED BLOOD LOSS:  Minimal.    COUNT:  Correct. PROCEDURE:  The patient was brought into room, time-out was carried out and all were in agreement. Rolled onto his left side and MAC anesthesia administered. Scope inserted into the rectum. Anal exam was normal.  Scope passed to level of cecum without difficulty. Slow withdrawal was done. Cecum normal, ascending colon normal, transverse colon normal.  Descending colon and sigmoid colon had scattered diverticula. Rectum normal.  As much air as possible was removed prior to removing the scope. The patient tolerated the procedure well. Recommend repeat in 5 years.       Jael Gray MD      [id number]  D:  2023 10:58  T:  2023 13:25  JOB #:  [id number]

## 2023-10-20 PROBLEM — Z12.11 COLON CANCER SCREENING: Status: RESOLVED | Noted: 2023-07-20 | Resolved: 2023-10-20

## 2024-05-16 ENCOUNTER — TELEPHONE (OUTPATIENT)
Dept: PRIMARY CARE CLINIC | Facility: CLINIC | Age: 64
End: 2024-05-16

## 2024-05-16 NOTE — TELEPHONE ENCOUNTER
Pt's wife called in to schedule kera, told  that pt is having chest pains and tingling in the arms. Pt's wife scheduled an appointment for 2 weeks out. Once I found out the pt's symptoms, I called Tariq to suggest going to an urgent care or preferably an ER with those type of symptoms. Pt then cancelled the appointment the appointment his wife made for 5/29. Pt was under the impression that we could check for artery blockage in the office.

## (undated) DEVICE — GAUZE,SPONGE,4"X4",12PLY,WOVEN,NS,LF: Brand: MEDLINE

## (undated) DEVICE — SYRINGE MED 3ML CLR PLAS STD N CTRL LUERLOCK TIP DISP

## (undated) DEVICE — LUBE JELLY FOIL PACK 1.4 OZ: Brand: MEDLINE INDUSTRIES, INC.

## (undated) DEVICE — SINGLE PORT MANIFOLD: Brand: NEPTUNE 2

## (undated) DEVICE — CANNULA NSL ORAL AD FOR CAPNOFLEX CO2 O2 AIRLFE

## (undated) DEVICE — KENDALL RADIOLUCENT FOAM MONITORING ELECTRODE RECTANGULAR SHAPE: Brand: KENDALL

## (undated) DEVICE — SYRINGE MED 10ML LUERLOCK TIP W/O SFTY DISP

## (undated) DEVICE — YANKAUER,BULB TIP,W/O VENT,RIGID,STERILE: Brand: MEDLINE

## (undated) DEVICE — NEEDLE SYR 18GA L1.5IN RED PLAS HUB S STL BLNT FILL W/O

## (undated) DEVICE — AIRLIFE™ OXYGEN TUBING 7 FEET (2.1 M) CRUSH RESISTANT OXYGEN TUBING, VINYL TIPPED: Brand: AIRLIFE™

## (undated) DEVICE — ENDOSCOPIC KIT 1.1+ OP4 CA DE 2 GWN AAMI LEVEL 3

## (undated) DEVICE — CONNECTOR TBNG OD5-7MM O2 END DISP

## (undated) DEVICE — SYRINGE, LUER SLIP, STERILE, 60ML: Brand: MEDLINE